# Patient Record
Sex: MALE | Race: WHITE | Employment: FULL TIME | URBAN - METROPOLITAN AREA
[De-identification: names, ages, dates, MRNs, and addresses within clinical notes are randomized per-mention and may not be internally consistent; named-entity substitution may affect disease eponyms.]

---

## 2017-11-06 ENCOUNTER — OFFICE VISIT (OUTPATIENT)
Dept: ORTHOPEDIC SURGERY | Age: 57
End: 2017-11-06

## 2017-11-06 VITALS
SYSTOLIC BLOOD PRESSURE: 107 MMHG | DIASTOLIC BLOOD PRESSURE: 78 MMHG | HEIGHT: 74 IN | WEIGHT: 264.99 LBS | BODY MASS INDEX: 34.01 KG/M2 | HEART RATE: 85 BPM

## 2017-11-06 DIAGNOSIS — M25.561 PAIN AND SWELLING OF KNEE, RIGHT: Primary | ICD-10-CM

## 2017-11-06 DIAGNOSIS — M25.461 PAIN AND SWELLING OF KNEE, RIGHT: Primary | ICD-10-CM

## 2017-11-06 DIAGNOSIS — S83.241A TEAR OF MEDIAL MENISCUS OF RIGHT KNEE, CURRENT, UNSPECIFIED TEAR TYPE, INITIAL ENCOUNTER: ICD-10-CM

## 2017-11-06 PROCEDURE — 99203 OFFICE O/P NEW LOW 30 MIN: CPT | Performed by: ORTHOPAEDIC SURGERY

## 2017-11-06 NOTE — PROGRESS NOTES
CHIEF COMPLAINT:    Chief Complaint   Patient presents with    Knee Pain     right knee pain. ER on 11/3/17 after he was going down steps & felt a pop. HISTORY OF PRESENT ILLNESS:                The patient is a 56 y.o. male   Past Medical History:   Diagnosis Date    Hyperlipidemia       This patient presents with right knee pain. He was on some bleachers and felt acute onset his knee about a week ago. Then, on 11/3/17 he is going some steps and had immediate and severe pain with a pop in the back of his knee. He is to sit down for a couple of minutes before he can even get around. He then went to the emergency department. 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 He has had surgery on the LEFT knee.     The pain assessment was noted & is as follows:  Pain Assessment  Location of Pain: Knee  Location Modifiers: Right  Severity of Pain: 7  Quality of Pain: Aching, Dull, Sharp, Throbbing  Duration of Pain: Persistent  Frequency of Pain: Intermittent  Aggravating Factors: Walking, Bending, Stretching  Limiting Behavior: Some  Relieving Factors: Rest  Result of Injury: Yes  Work-Related Injury: No  Are there other pain locations you wish to document?: No]      Work Status/Functionality:     Past Medical History: Medical history form was reviewed today & can be found in the media tab  Past Medical History:   Diagnosis Date    Hyperlipidemia       Past Surgical History:     Past Surgical History:   Procedure Laterality Date    HAND SURGERY      KNEE SURGERY      left    TONSILLECTOMY       Current Medications:     Current Outpatient Prescriptions:     omeprazole (PRILOSEC) 40 MG delayed release capsule, Take 40 mg by mouth daily, Disp: , Rfl:     atorvastatin (LIPITOR) 40 MG tablet, Take 40 mg by mouth daily, Disp: , Rfl:     naproxen (NAPROSYN) 500 MG tablet, Take 1 tablet by mouth 2 times daily, Disp: 30 tablet, Rfl: 0  Allergies:  Penicillins  Social History:    reports that he has never smoked. He has never used smokeless tobacco.  Family History:   History reviewed. No pertinent family history. REVIEW OF SYSTEMS:   For new problems, a full review of systems will be found scanned in the patient's chart. CONSTITUTIONAL: Denies unexplained weight loss, fevers, chills   NEUROLOGICAL: Denies unsteady gait or progressive weakness  SKIN: Denies skin changes, delayed healing, rash, itching       PHYSICAL EXAM:    Vitals: Blood pressure 107/78, pulse 85, height 6' 2.02\" (1.88 m), weight 264 lb 15.9 oz (120.2 kg). GENERAL EXAM:  · General Apparence: Patient is adequately groomed with no evidence of malnutrition. · Orientation: The patient is oriented to time, place and person. · Mood & Affect:The patient's mood and affect are appropriate       Right knee PHYSICAL EXAMINATION:  · Inspection:  1+ effusion. No obvious deformity or ecchymosis or edema. · Palpation:  Tender posterior medially over the medial meniscus. · Range of Motion: Limited by pain. 0-90°. · Strength: No focal motor weakness    · Special Tests:  Reproducible pain with Francisco testing medially. ACL MCL PCL LCL are all intact. Negative lateral Francisco. · Skin:  There are no rashes, ulcerations or lesions. · Gait & station: Crutches and knee immobilizer      · Additional Examinations:        Left Lower Extremity: Examination of the left lower extremity does not show any tenderness, deformity or injury. Range of motion is unremarkable. There is no gross instability. There are no rashes, ulcerations or lesions. Strength and tone are normal.    Negative straight leg raise examination. Negative log roll examination. Diagnostic Testing:      3 x-ray views of the right knee demonstrates some very early arthritic change consistent with this man's 56years of age.     Orders     Orders Placed This Encounter   Procedures    XR KNEE

## 2017-11-07 ENCOUNTER — TELEPHONE (OUTPATIENT)
Dept: ORTHOPEDIC SURGERY | Age: 57
End: 2017-11-07

## 2017-11-13 ENCOUNTER — OFFICE VISIT (OUTPATIENT)
Dept: ORTHOPEDIC SURGERY | Age: 57
End: 2017-11-13

## 2017-11-13 ENCOUNTER — TELEPHONE (OUTPATIENT)
Dept: ORTHOPEDIC SURGERY | Age: 57
End: 2017-11-13

## 2017-11-13 VITALS
BODY MASS INDEX: 34.01 KG/M2 | DIASTOLIC BLOOD PRESSURE: 75 MMHG | HEART RATE: 68 BPM | SYSTOLIC BLOOD PRESSURE: 130 MMHG | WEIGHT: 264.99 LBS | HEIGHT: 74 IN

## 2017-11-13 DIAGNOSIS — M94.261 CHONDROMALACIA, RIGHT KNEE: ICD-10-CM

## 2017-11-13 DIAGNOSIS — S83.241A TEAR OF MEDIAL MENISCUS OF RIGHT KNEE, CURRENT, UNSPECIFIED TEAR TYPE, INITIAL ENCOUNTER: Primary | ICD-10-CM

## 2017-11-13 DIAGNOSIS — S83.211A BUCKET-HANDLE TEAR OF MEDIAL MENISCUS OF RIGHT KNEE AS CURRENT INJURY, INITIAL ENCOUNTER: Primary | ICD-10-CM

## 2017-11-13 PROCEDURE — 99213 OFFICE O/P EST LOW 20 MIN: CPT | Performed by: ORTHOPAEDIC SURGERY

## 2017-11-13 NOTE — ADDENDUM NOTE
Encounter addended by: Harris Shone, MA on: 11/13/2017 11:35 AM<BR>    Actions taken: Letter status changed

## 2017-11-13 NOTE — ADDENDUM NOTE
Encounter addended by: Rochelle Slater MA on: 11/13/2017  4:17 PM<BR>    Actions taken: Letter status changed

## 2017-11-13 NOTE — PROGRESS NOTES
CHIEF COMPLAINT:    Chief Complaint   Patient presents with    Results     RIGHT KNEE MRI RESULTS        HISTORY OF PRESENT ILLNESS:                The patient is a 56 y.o. male who returns to clinic to review his MRI results of the right knee. He has been using a knee immobilizer and crutches since his last visit which she states has been helping with his pain. He points the medial aspect of the knee as a location of his pain. He does have an upcoming trip in the beginning of December to [place]    Past Medical History:   Diagnosis Date    Hyperlipidemia           The pain assessment was noted & is as follows:  Pain Assessment  Location of Pain: Knee  Location Modifiers: Right  Severity of Pain: 1  Quality of Pain: Aching, Sharp  Duration of Pain: Persistent  Frequency of Pain: Intermittent  Aggravating Factors: Bending, Exercise, Walking, Standing, Stairs  Limiting Behavior: Yes  Relieving Factors: Rest  Result of Injury: Yes]      Work Status/Functionality:     Past Medical History: Medical history form was reviewed today & can be found in the media tab  Past Medical History:   Diagnosis Date    Hyperlipidemia       Past Surgical History:     Past Surgical History:   Procedure Laterality Date    HAND SURGERY      KNEE SURGERY      left    TONSILLECTOMY       Current Medications:     Current Outpatient Prescriptions:     omeprazole (PRILOSEC) 40 MG delayed release capsule, Take 40 mg by mouth daily, Disp: , Rfl:     atorvastatin (LIPITOR) 40 MG tablet, Take 40 mg by mouth daily, Disp: , Rfl:     naproxen (NAPROSYN) 500 MG tablet, Take 1 tablet by mouth 2 times daily, Disp: 30 tablet, Rfl: 0  Allergies:  Penicillins  Social History:    reports that he has never smoked. He has never used smokeless tobacco.  Family History:   No family history on file. REVIEW OF SYSTEMS:   For new problems, a full review of systems will be found scanned in the patient's chart.   CONSTITUTIONAL: Denies unexplained to have him wait 2 weeks after surgery to travel if at all possible. If he doesn't have to travel during this period, We would recommend taking a full strength aspirin during this travel to decrease blood clot wrist.    We discussed the risks, benefits, and complications of arthroscopic knee surgery. The patient realizes that there are concerns with this surgery with respect to infection, deep vein thrombosis, neurological injury, delayed  rehabilitation, the possibility of arthrofibrosis of the knee, and specifically  Hoffa's fat pad fibrosis that can potentially cause difficulties. The patient realizes that there are also anesthetic concerns including cardiopulmonary issues, pulmonary issues, and even possibility of death or dystrophy. The patient voiced understanding to this as well as the normal  rehabilitation  that   is involved with weeks of physical therapy, exercise, and strengthening. The patient also realizes that even though the surgery, from a functional perspective, typically allows the patient to return to good function at about 6 weeks, that it often takes 6 months to completely rehabilitate from this operation. The patient also realizes that if there is an arthritic component to the symptoms, then they may still have some degree of arthritis pain. I have personally performed and/or participated in the history, exam and medical decision making and agree with all pertinent clinical information. I have also reviewed and agree with the past medical, family and social history unless otherwise noted. This dictation was performed with a verbal recognition program (DRAGON) and it was checked for errors. It is possible that there are still dictated errors within this office note. If so, please bring any errors to my attention for an addendum. All efforts were made to ensure that this office note is accurate.           Una Sherman MD

## 2017-11-16 ENCOUNTER — HOSPITAL ENCOUNTER (OUTPATIENT)
Dept: SURGERY | Age: 57
Discharge: HOME | End: 2017-11-16
Attending: ORTHOPAEDIC SURGERY | Admitting: ORTHOPAEDIC SURGERY

## 2017-11-16 VITALS
OXYGEN SATURATION: 94 % | WEIGHT: 263 LBS | SYSTOLIC BLOOD PRESSURE: 114 MMHG | BODY MASS INDEX: 33.75 KG/M2 | TEMPERATURE: 97.5 F | DIASTOLIC BLOOD PRESSURE: 71 MMHG | HEIGHT: 74 IN | RESPIRATION RATE: 18 BRPM | HEART RATE: 67 BPM

## 2017-11-16 RX ORDER — ONDANSETRON 2 MG/ML
4 INJECTION INTRAMUSCULAR; INTRAVENOUS
Status: ACTIVE | OUTPATIENT
Start: 2017-11-16 | End: 2017-11-16

## 2017-11-16 RX ORDER — MEPERIDINE HYDROCHLORIDE 50 MG/ML
12.5 INJECTION INTRAMUSCULAR; INTRAVENOUS; SUBCUTANEOUS EVERY 5 MIN PRN
Status: DISCONTINUED | OUTPATIENT
Start: 2017-11-16 | End: 2017-11-17 | Stop reason: HOSPADM

## 2017-11-16 RX ORDER — MORPHINE SULFATE 2 MG/ML
1 INJECTION, SOLUTION INTRAMUSCULAR; INTRAVENOUS EVERY 5 MIN PRN
Status: DISCONTINUED | OUTPATIENT
Start: 2017-11-16 | End: 2017-11-17 | Stop reason: HOSPADM

## 2017-11-16 RX ORDER — HYDROCODONE BITARTRATE AND ACETAMINOPHEN 5; 325 MG/1; MG/1
1 TABLET ORAL EVERY 6 HOURS PRN
Qty: 30 TABLET | Refills: 0 | Status: SHIPPED | OUTPATIENT
Start: 2017-11-16 | End: 2017-12-11 | Stop reason: ALTCHOICE

## 2017-11-16 RX ORDER — SODIUM CHLORIDE 0.9 % (FLUSH) 0.9 %
10 SYRINGE (ML) INJECTION PRN
Status: DISCONTINUED | OUTPATIENT
Start: 2017-11-16 | End: 2017-11-17 | Stop reason: HOSPADM

## 2017-11-16 RX ORDER — HYDRALAZINE HYDROCHLORIDE 20 MG/ML
5 INJECTION INTRAMUSCULAR; INTRAVENOUS EVERY 10 MIN PRN
Status: DISCONTINUED | OUTPATIENT
Start: 2017-11-16 | End: 2017-11-17 | Stop reason: HOSPADM

## 2017-11-16 RX ORDER — DIPHENHYDRAMINE HYDROCHLORIDE 50 MG/ML
12.5 INJECTION INTRAMUSCULAR; INTRAVENOUS
Status: ACTIVE | OUTPATIENT
Start: 2017-11-16 | End: 2017-11-16

## 2017-11-16 RX ORDER — PROMETHAZINE HYDROCHLORIDE 25 MG/ML
6.25 INJECTION, SOLUTION INTRAMUSCULAR; INTRAVENOUS
Status: ACTIVE | OUTPATIENT
Start: 2017-11-16 | End: 2017-11-16

## 2017-11-16 RX ORDER — ASPIRIN 325 MG
325 TABLET ORAL DAILY
Qty: 14 TABLET | Refills: 0 | Status: SHIPPED | OUTPATIENT
Start: 2017-11-16 | End: 2019-08-13

## 2017-11-16 RX ORDER — LABETALOL HYDROCHLORIDE 5 MG/ML
5 INJECTION, SOLUTION INTRAVENOUS EVERY 10 MIN PRN
Status: DISCONTINUED | OUTPATIENT
Start: 2017-11-16 | End: 2017-11-17 | Stop reason: HOSPADM

## 2017-11-16 RX ORDER — CLINDAMYCIN HYDROCHLORIDE 150 MG/1
CAPSULE ORAL
Qty: 4 CAPSULE | Refills: 0 | Status: SHIPPED | OUTPATIENT
Start: 2017-11-16 | End: 2019-08-13

## 2017-11-16 RX ORDER — SODIUM CHLORIDE 0.9 % (FLUSH) 0.9 %
10 SYRINGE (ML) INJECTION EVERY 12 HOURS SCHEDULED
Status: DISCONTINUED | OUTPATIENT
Start: 2017-11-16 | End: 2017-11-17 | Stop reason: HOSPADM

## 2017-11-16 RX ORDER — OXYCODONE HYDROCHLORIDE AND ACETAMINOPHEN 5; 325 MG/1; MG/1
1 TABLET ORAL PRN
Status: ACTIVE | OUTPATIENT
Start: 2017-11-16 | End: 2017-11-16

## 2017-11-16 RX ORDER — LIDOCAINE HYDROCHLORIDE 10 MG/ML
1 INJECTION, SOLUTION EPIDURAL; INFILTRATION; INTRACAUDAL; PERINEURAL
Status: ACTIVE | OUTPATIENT
Start: 2017-11-16 | End: 2017-11-16

## 2017-11-16 RX ORDER — MORPHINE SULFATE 2 MG/ML
2 INJECTION, SOLUTION INTRAMUSCULAR; INTRAVENOUS EVERY 5 MIN PRN
Status: DISCONTINUED | OUTPATIENT
Start: 2017-11-16 | End: 2017-11-17 | Stop reason: HOSPADM

## 2017-11-16 RX ORDER — SODIUM CHLORIDE, SODIUM LACTATE, POTASSIUM CHLORIDE, CALCIUM CHLORIDE 600; 310; 30; 20 MG/100ML; MG/100ML; MG/100ML; MG/100ML
INJECTION, SOLUTION INTRAVENOUS CONTINUOUS
Status: DISCONTINUED | OUTPATIENT
Start: 2017-11-16 | End: 2017-11-17 | Stop reason: HOSPADM

## 2017-11-16 RX ORDER — OXYCODONE HYDROCHLORIDE AND ACETAMINOPHEN 5; 325 MG/1; MG/1
2 TABLET ORAL PRN
Status: ACTIVE | OUTPATIENT
Start: 2017-11-16 | End: 2017-11-16

## 2017-11-16 RX ADMIN — SODIUM CHLORIDE, SODIUM LACTATE, POTASSIUM CHLORIDE, CALCIUM CHLORIDE: 600; 310; 30; 20 INJECTION, SOLUTION INTRAVENOUS at 14:50

## 2017-11-16 ASSESSMENT — PAIN SCALES - GENERAL
PAINLEVEL_OUTOF10: 0
PAINLEVEL_OUTOF10: 2

## 2017-11-16 ASSESSMENT — PAIN - FUNCTIONAL ASSESSMENT: PAIN_FUNCTIONAL_ASSESSMENT: 0-10

## 2017-11-16 NOTE — ANESTHESIA PRE-OP
Diagnosis Date    Arthritis     GERD (gastroesophageal reflux disease)     Hyperlipidemia        Past Surgical History:        Procedure Laterality Date    HAND SURGERY      KNEE SURGERY      left    LASIK Bilateral     TONSILLECTOMY         Social History:    Social History   Substance Use Topics    Smoking status: Never Smoker    Smokeless tobacco: Never Used    Alcohol use No      Comment: rarely                                Counseling given: Not Answered      Vital Signs (Current):   Vitals:    11/16/17 1438   BP: 125/69   Pulse: 69   Resp: 16   Temp: 97.4 °F (36.3 °C)   SpO2: 97%   Weight: 263 lb (119.3 kg)   Height: 6' 2\" (1.88 m)                                              BP Readings from Last 3 Encounters:   11/16/17 125/69   11/13/17 130/75   11/06/17 107/78       NPO Status: Time of last liquid consumption: 2000                        Time of last solid consumption: 2000                        Date of last liquid consumption: 11/16/17                        Date of last solid food consumption: 11/15/17    BMI:   Wt Readings from Last 3 Encounters:   11/16/17 263 lb (119.3 kg)   11/13/17 264 lb (119.7 kg)   11/13/17 264 lb 15.9 oz (120.2 kg)     Body mass index is 33.77 kg/m².     Anesthesia Evaluation  Patient summary reviewed and Nursing notes reviewed no history of anesthetic complications:   Airway: Mallampati: II  TM distance: >3 FB   Neck ROM: full  Mouth opening: > = 3 FB Dental: normal exam         Pulmonary:Negative Pulmonary ROS and normal exam                               Cardiovascular:Negative CV ROS  Exercise tolerance: good (>4 METS),   (+) hyperlipidemia                  Neuro/Psych:   Negative Neuro/Psych ROS              GI/Hepatic/Renal: Neg GI/Hepatic/Renal ROS  (+) GERD: well controlled,      (-) hiatal hernia       Endo/Other: Negative Endo/Other ROS   (+) : arthritis:., .                 Abdominal:           Vascular:                                     Pre-Operative Diagnosis: MEDIAL MENISCUS TEAR RIGHT KNEE    56 y.o.   BMI:  Body mass index is 33.77 kg/m². Vitals:    11/16/17 1438   BP: 125/69   Pulse: 69   Resp: 16   Temp: 97.4 °F (36.3 °C)   SpO2: 97%   Weight: 263 lb (119.3 kg)   Height: 6' 2\" (1.88 m)       Allergies   Allergen Reactions    Penicillins        Social History   Substance Use Topics    Smoking status: Never Smoker    Smokeless tobacco: Never Used    Alcohol use No      Comment: rarely       LABS:    CBC  No results found for: WBC, HGB, HCT, PLT  RENAL  No results found for: NA, K, CL, CO2, BUN, CREATININE, GLUCOSE  COAGS  No results found for: PROTIME, INR, APTT     Anesthesia Plan      general     ASA 2     (I discussed with the patient the risks and benefits of PIV, general anesthesia, IV Narcotics, PACU. All questions were answered the patient agrees with the plan.)  Induction: intravenous. MIPS: Postoperative opioids intended and Prophylactic antiemetics administered. Anesthetic plan and risks discussed with patient. Plan discussed with CRNA.                   Tricia Mitchell MD   11/16/2017

## 2017-11-16 NOTE — BRIEF OP NOTE
Brief Postoperative Note    Alirio Bush  YOB: 1960  [id number]    Pre-operative Diagnosis: Right knee MMT    Post-operative Diagnosis: Same    Procedure: Right knee arthroscopy, PMM, PLM, chondro, syno    Anesthesia: General    Surgeons/Assistants: Issac Thayer MD, Rudolph MORALES    Estimated Blood Loss: less than 50     Complications: None    Specimens: Was Not Obtained    Findings: MMT, LMT, OA    Electronically signed by ANDREW Ricardo on 11/16/2017 at 4:50 PM

## 2017-11-17 NOTE — ANESTHESIA POST-OP
Postoperative Anesthesia Note    Name:    Mercy Aceves  MRN:      [id number]    Patient Vitals for the past 12 hrs:   BP Temp Temp src Pulse Resp SpO2 Height Weight   11/16/17 1725 114/71 - - 67 18 94 % - -   11/16/17 1705 - - - 68 16 93 % - -   11/16/17 1700 124/73 - - 72 16 94 % - -   11/16/17 1655 105/66 - - 72 16 94 % - -   11/16/17 1651 116/65 97.5 °F (36.4 °C) Temporal 89 16 93 % - -   11/16/17 1438 125/69 97.4 °F (36.3 °C) - 69 16 97 % 6' 2\" (1.88 m) 263 lb (119.3 kg)        LABS:    CBC  No results found for: WBC, HGB, HCT, PLT  RENAL  No results found for: NA, K, CL, CO2, BUN, CREATININE, GLUCOSE  COAGS  No results found for: PROTIME, INR, APTT    Intake & Output: In: 1100 [P.O.:100; I.V.:1000]  Out: 25     Nausea & Vomiting:  No    Level of Consciousness:  Awake    Pain Assessment:  Adequate analgesia    Anesthesia Complications:  No apparent anesthetic complications    SUMMARY      Vital signs stable  OK to discharge from Stage I post anesthesia care.   Care transferred from Anesthesiology department on discharge from perioperative area

## 2017-11-17 NOTE — OP NOTE
that was pathologic was removed. This include  synovectomy at the medial gutter, lateral gutter and across the anterior  aspect of the knee. There was an adhesion from the anterior aspect of the  medial meniscus to the fat pad, which was taken down. No other pathology  was demonstrated. The knee was copiously irrigated and the instruments  were removed and 30 mL of Marcaine was instilled into the portals. Dry  sterile dressings were applied over Steri-Strips. The patient went to the  recovery room postoperatively having tolerated the procedure well.         Stuart Galicia MD    D: 11/17/2017 8:43:35       T: 11/17/2017 11:47:44     [email or web address]  Job#: [id number]     Doc#: [id number]

## 2017-11-18 ENCOUNTER — HOSPITAL ENCOUNTER (OUTPATIENT)
Dept: PHYSICAL THERAPY | Age: 57
Discharge: HOME | End: 2017-11-30
Admitting: ORTHOPAEDIC SURGERY

## 2017-11-18 NOTE — PLAN OF CARE
[facility], [address] [place] [address]  Phone: [phone]  Fax [phone]     Physical Therapy Certification    Dear Referring Practitioner: Dr. Landon Dubin,    We had the pleasure of evaluating the following patient for physical therapy services at [address] [place] [address]. A summary of our findings can be found in the initial assessment below. This includes our plan of care. If you have any questions or concerns regarding these findings, please do not hesitate to contact me at the office phone number checked above. Thank you for the referral.       Physician Signature:_______________________________Date:__________________  By signing above (or electronic signature), therapists plan is approved by physician    Patient: Eddie Zeng   : 1960   MRN: [id number]  Referring Physician: Referring Practitioner: Dr. Landon Dubin      Evaluation Date: 2017      Medical Diagnosis Information:  Diagnosis: T53.865B [place] meniscus tear R knee    Treatment Diagnosis: Y92.763T R knee meniscus buckethandle tear, M25.561 R knee pain, MM25.661 R knee stiffness                                         Insurance information: PT Insurance Information: [facility] - 30 visits     Precautions/ Contra-indications: None   Latex Allergy:  [x]NO      []YES  Preferred Language for Healthcare:   [x]English       []other:    SUBJECTIVE: Patient stated complaint: Pt initially hurt the R knee walking down the stairs 2 weeks ago. His knee gave way and he felt a pop and pain. Pt had an MRI and had surgery on 17. Pt is flying to [address] [place] [address] on 17 to take granddaughters to [facility] and would like to be able to walk around.      Relevant Medical History: Knee surgery   Functional Disability Index:PT G-Codes  Functional Assessment Tool Used: LEFS  Score: 84%   Functional Limitation: Mobility: Walking and moving around  Mobility: Walking and Moving Around Current Status (): At least 80 percent but less than 100 percent impaired, limited or restricted  Mobility: Walking and Moving Around Goal Status (): At least 20 percent but less than 40 percent impaired, limited or restricted    Pain Scale: 7/10  Easing factors: ice, meds  Provocative factors: bending knee when walking     Type: [x]Constant   []Intermittent  []Radiating []Localized []other:     Numbness/Tingling: none     Occupation/School: Desk job - store owner     Living Status/Prior Level of Function: Independent with ADLs and IADLs,     OBJECTIVE:     ROM LEFT RIGHT   Knee ext -6 from zero -12 from zero   Knee Flex 130 70   Strength  LEFT RIGHT   Quad tone  good Fair    Circumference  Mid apex  7 cm prox     44 cm    45.5 cm     Reflexes/Sensation:    [x]Dermatomes/Myotomes intact    []Reflexes equal and normal bilaterally   []Other:    Joint mobility:    []Normal    [x]Hypo   []Hyper    Palpation: slight increase in warmth around the knee. Decreased patella mobility. Tender over lateral HS    Functional Mobility/Transfers: Moderate assist to move R leg up onto plinth    Posture: Pt hesitant to bend the R knee. Bend forward with crutches    Bandages/Dressings/Incisions: Post op bandages were removed. Steri strips were covered with tegaderm. Some dried blood was present. No signs of infection. Gait: (include devices/WB status) Pt amb WBAT with B crutches. Crutches had to be adjusted and pt was educated on proper ambulation pattern. Heel toe and knee bend. Orthopedic Special Tests: NA                        [x] Patient history, allergies, meds reviewed. Medical chart reviewed. See intake form. Review Of Systems (ROS):  [x]Performed Review of systems (Integumentary, CardioPulmonary, Neurological) by intake and observation. Intake form has been scanned into medical record.  Patient has been instructed to contact their primary care physician regarding ROS issues if not []Signs/symptoms consistent with tendinitis/tendinosis    []signs/symptoms consistent with pathology which may benefit from Dry needling      []other:      Prognosis/Rehab Potential:      []Excellent   [x]Good    []Fair   []Poor    Tolerance of evaluation/treatment: Pt got dizzy two separate times during evaluation    []Excellent   []Good    [x]Fair   []Poor  Physical Therapy Evaluation Complexity Justification  [x] A history of present problem with:  [x] no personal factors and/or comorbidities that impact the plan of care;  []1-2 personal factors and/or comorbidities that impact the plan of care  []3 personal factors and/or comorbidities that impact the plan of care  [x] An examination of body systems using standardized tests and measures addressing any of the following: body structures and functions (impairments), activity limitations, and/or participation restrictions;:  [x] a total of 1-2 or more elements   [] a total of 3 or more elements   [] a total of 4 or more elements   [x] A clinical presentation with:  [] stable and/or uncomplicated characteristics   [x] evolving clinical presentation with changing characteristics  [] unstable and unpredictable characteristics;   [x] Clinical decision making of [x] low, [] moderate, [] high complexity using standardized patient assessment instrument and/or measurable assessment of functional outcome. [x] EVAL (LOW) 12104 (typically 20 minutes face-to-face)  [] EVAL (MOD) 22921 (typically 30 minutes face-to-face)  [] EVAL (HIGH) 94319 (typically 45 minutes face-to-face)  [] RE-EVAL       PLAN:   Frequency/Duration:  2 days per week for 4 Weeks:  Interventions:  [x]  Therapeutic exercise including: strength training, ROM, for Lower extremity and core   [x]  NMR activation and proprioception for LE, Glutes and Core   [x]  Manual therapy as indicated for LE, Hip and spine to include: Dry Needling/IASTM, STM, PROM, Gr I-IV mobilizations, manipulation.    [x] Modalities as needed that may include: thermal agents, E-stim, Biofeedback, US, iontophoresis as indicated  [x] Patient education on joint protection, postural re-education, activity modification, progression of HEP. HEP instruction: (see scanned forms)    GOALS:  Patient stated goal: Be ready to take slick to [place] and build strength in the knee     Therapist goals for Patient:   Short Term Goals: To be achieved in: 2 weeks  1. Independent in HEP and progression per patient tolerance, in order to prevent re-injury. 2. Patient will have a decrease in pain to facilitate improvement in movement, function, and ADLs as indicated by Functional Deficits. Long Term Goals: To be achieved in: 4 weeks  1. Disability index score of 35% or less for the LEFS to assist with reaching prior level of function. 2. Patient will demonstrate increased AROM to knee flex 120 and ext:-5 from zero to allow for proper joint functioning as indicated by patients Functional Deficits. 3. Patient will demonstrate an increase in Strength to good proximal hip strength and control, within 5lb HHD in LE to allow for proper functional mobility as indicated by patients Functional Deficits. 4. Patient will return to walking and golf without increased symptoms or restriction. 5. Pt will demonstrate a proper gait pattern without assistive device and will be able to demonstrate a TUG in under 12 sec.       Electronically signed by:  Tariq Zambrano PT, DPT #[id number]

## 2017-11-18 NOTE — FLOWSHEET NOTE
[facility],  [address]  Phone: [phone]  Fax [phone]    Physical Therapy Daily Treatment Note  Date:  2017    Patient Name:  Eliud Gibson  \"Maribel\"  :  1960  MRN: [id number]  Restrictions/Precautions:    Medical/Treatment Diagnosis Information:  · Diagnosis: S83.211A Buckethandle meniscus tear R knee   · Treatment Diagnosis: S83.211A R knee meniscus buckethandle tear, M25.561 R knee pain, MM25.661 R knee stiffness  Insurance/Certification information:  [facility]  Physician Information:  Referring Practitioner: Dr. Michael Abdul of care signed (Y/N):     Date of Patient follow up with Physician: 17    G-Code (if applicable):      Date G-Code Applied:    PT G-Codes  Functional Assessment Tool Used: LEFS  Score: 84%   Functional Limitation: Mobility: Walking and moving around  Mobility: Walking and Moving Around Current Status (): At least 80 percent but less than 100 percent impaired, limited or restricted  Mobility: Walking and Moving Around Goal Status ():  At least 20 percent but less than 40 percent impaired, limited or restricted    Progress Note: [x]  Yes  []  No  Next due by: Visit #10       Latex Allergy:  [x]NO      []YES  Preferred Language for Healthcare:   [x]English       []other:    Visit # Insurance Allowable Requires auth       [x]no        []yes:       Pain level:  7/10     SUBJECTIVE:  See eval    OBJECTIVE: See eval  Observation:   Test measurements:      RESTRICTIONS/PRECAUTIONS: Pt did get dizzy 2x during initial evaluation    Exercises/Interventions:     Therapeutic Ex Sets/sec Reps Notes   gastroc belt stretch  30 3    HS stretch  30 3    Quad set  5 15    Heel slides  10 10    SLR   5  PT assist                                       Manual Intervention                                          NMR re-education Therapeutic Exercise and NMR EXR  [] (38104) Provided verbal/tactile cueing for activities related to strengthening, flexibility, endurance, ROM for improvements in LE, proximal hip, and core control with self care, mobility, lifting, ambulation.  [] (45537) Provided verbal/tactile cueing for activities related to improving balance, coordination, kinesthetic sense, posture, motor skill, proprioception  to assist with LE, proximal hip, and core control in self care, mobility, lifting, ambulation and eccentric single leg control.      NMR and Therapeutic Activities:    [] (19903 or 05453) Provided verbal/tactile cueing for activities related to improving balance, coordination, kinesthetic sense, posture, motor skill, proprioception and motor activation to allow for proper function of core, proximal hip and LE with self care and ADLs  [] (34557) Gait Re-education- Provided training and instruction to the patient for proper LE, core and proximal hip recruitment and positioning and eccentric body weight control with ambulation re-education including up and down stairs     Home Exercise Program:    [x] (43492) Reviewed/Progressed HEP activities related to strengthening, flexibility, endurance, ROM of core, proximal hip and LE for functional self-care, mobility, lifting and ambulation/stair navigation   [x] (97887)Reviewed/Progressed HEP activities related to improving balance, coordination, kinesthetic sense, posture, motor skill, proprioception of core, proximal hip and LE for self care, mobility, lifting, and ambulation/stair navigation      Manual Treatments:  PROM / STM / Oscillations-Mobs:  G-I, II, III, IV (PA's, Inf., Post.)  [] (25306) Provided manual therapy to mobilize LE, proximal hip and/or LS spine soft tissue/joints for the purpose of modulating pain, promoting relaxation,  increasing ROM, reducing/eliminating soft tissue swelling/inflammation/restriction, improving soft tissue

## 2017-11-20 ENCOUNTER — OFFICE VISIT (OUTPATIENT)
Dept: ORTHOPEDIC SURGERY | Age: 57
End: 2017-11-20

## 2017-11-20 ENCOUNTER — HOSPITAL ENCOUNTER (OUTPATIENT)
Dept: PHYSICAL THERAPY | Age: 57
Discharge: HOME | End: 2017-11-20
Admitting: ORTHOPAEDIC SURGERY

## 2017-11-20 VITALS
SYSTOLIC BLOOD PRESSURE: 117 MMHG | HEIGHT: 74 IN | DIASTOLIC BLOOD PRESSURE: 79 MMHG | HEART RATE: 80 BPM | BODY MASS INDEX: 33.75 KG/M2 | WEIGHT: 263.01 LBS

## 2017-11-20 DIAGNOSIS — Z98.890 H/O ARTHROSCOPY OF KNEE: ICD-10-CM

## 2017-11-20 DIAGNOSIS — S83.241A TEAR OF MEDIAL MENISCUS OF RIGHT KNEE, CURRENT, UNSPECIFIED TEAR TYPE, INITIAL ENCOUNTER: Primary | ICD-10-CM

## 2017-11-20 DIAGNOSIS — M94.261 CHONDROMALACIA, RIGHT KNEE: ICD-10-CM

## 2017-11-20 PROCEDURE — 99024 POSTOP FOLLOW-UP VISIT: CPT | Performed by: ORTHOPAEDIC SURGERY

## 2017-11-20 NOTE — FLOWSHEET NOTE
[facility],  [address] [place]  Phone: [phone]  Fax [phone]    Physical Therapy Daily Treatment Note  Date:  2017    Patient Name:  Justin Lyons  \"Maribel\"  :  1960  MRN: [id number]  Restrictions/Precautions:    Medical/Treatment Diagnosis Information:  · Diagnosis: S83.211A Buckethandle meniscus tear R knee   · Treatment Diagnosis: S83.211A R knee meniscus buckethandle tear, M25.561 R knee pain, MM25.661 R knee stiffness  Insurance/Certification information:  PT Insurance Information: [facility] - 30 visits  Physician Information:  Referring Practitioner: Dr. Tiara Valencia of care signed (Y/N):     Date of Patient follow up with Physician: 17    G-Code (if applicable):      Date G-Code Applied:         Progress Note: [x]  Yes  []  No  Next due by: Visit #10       Latex Allergy:  [x]NO      []YES  Preferred Language for Healthcare:   [x]English       []other:    Visit # Insurance Allowable Requires auth   2 30    [x]no        []yes:       Pain level:  3-4/10 right knee     SUBJECTIVE:  Pt states that he has been compliant with HEP twice a day. OBJECTIVE:   Observation: Amb w/ two crutches, WB'ing as ling'd right LE. Tegaderm and steri strips cover portal sites. No drainage or sign of infection. No calf tenderness/ redness/ or pain. Mabel's negative. Progressed TE and HEP w/ good tolerance. Pt given new written handout. Mod edema. Initiated V-comp/elevation/ankle pumps.   Test measurements:  -6 to 95°    RESTRICTIONS/PRECAUTIONS: Pt did get dizzy 2x during initial evaluation    Exercises/Interventions:     Therapeutic Ex Sets/sec Reps Notes   [place] 5 min     Gastroc Stretch on Incline H30x3           SB Bridge  H5 2x10     SB Knee Flex Stretch w/ Strap H10x10    Prone quad set w/ shin into [id number] 2017   Prone Quad Stretch w/ [id number] 2017   Prione sustained extension 2x 1 min     SAQ 0#H5 2x10  Hep [id number]   Standing TKE blue tb H5 2x10  HEP -2017   HS stretch  30 3    Quad set  5 15    Heel slides  10 10    SLR  3 5  PT assist                                       Manual Intervention      Pat JM, HF/quad/IT Band Stretching, kne flex-ext ROM 15 min                                   NMR re-education                                              Therapeutic Exercise and NMR EXR  [x] (37919) Provided verbal/tactile cueing for activities related to strengthening, flexibility, endurance, ROM for improvements in LE, proximal hip, and core control with self care, mobility, lifting, ambulation.  [] (36516) Provided verbal/tactile cueing for activities related to improving balance, coordination, kinesthetic sense, posture, motor skill, proprioception  to assist with LE, proximal hip, and core control in self care, mobility, lifting, ambulation and eccentric single leg control.      NMR and Therapeutic Activities:    [] (86678 or 02039) Provided verbal/tactile cueing for activities related to improving balance, coordination, kinesthetic sense, posture, motor skill, proprioception and motor activation to allow for proper function of core, proximal hip and LE with self care and ADLs  [] (08372) Gait Re-education- Provided training and instruction to the patient for proper LE, core and proximal hip recruitment and positioning and eccentric body weight control with ambulation re-education including up and down stairs     Home Exercise Program:    [x] (33684) Reviewed/Progressed HEP activities related to strengthening, flexibility, endurance, ROM of core, proximal hip and LE for functional self-care, mobility, lifting and ambulation/stair navigation   [x] (00120)Reviewed/Progressed HEP activities related to improving balance, coordination, kinesthetic sense, posture, motor skill, proprioception of core, proximal hip and LE for self care, mobility, lifting, and ambulation/stair navigation

## 2017-11-29 ENCOUNTER — HOSPITAL ENCOUNTER (OUTPATIENT)
Dept: PHYSICAL THERAPY | Age: 57
Discharge: HOME | End: 2017-11-29
Admitting: ORTHOPAEDIC SURGERY

## 2017-11-29 NOTE — FLOWSHEET NOTE
[facility], [address] [place]  Phone: [phone]  Fax [phone]    Physical Therapy Daily Treatment Note  Date:  2017    Patient Name:  Ayah Modi  \"Dubs\"  :  1960  MRN: [id number]  Restrictions/Precautions:    Medical/Treatment Diagnosis Information:  · Diagnosis: S83.211A Buckethandle meniscus tear R knee  Sx 17  · Treatment Diagnosis: S83.211A R knee meniscus buckethandle tear, M25.561 R knee pain, MM25.661 R knee stiffness  Insurance/Certification information:  PT Insurance Information: [facility] - 30 visits  Physician Information:  Referring Practitioner: Dr. Brianna Montana of care signed (Y/N):     Date of Patient follow up with Physician:     G-Code (if applicable):      Date G-Code Applied:         Progress Note: []  Yes  [x]  No  Next due by: Visit #10       Latex Allergy:  [x]NO      []YES  Preferred Language for Healthcare:   [x]English       []other:    Visit # Insurance Allowable Requires auth   3 30    [x]no        []yes:       Pain level:  0/10 right knee     SUBJECTIVE:  Pt reports his knee is stiff when he first gets up but then loosens up. Compliant with HEP and has added some marches.   Pt reports he is no longer using his crutches     OBJECTIVE: almost 2 weeks post op  Observation: pt ambulating without crutches with proper gait     Test measurements: R knee ext  AROM  -2°      RESTRICTIONS/PRECAUTIONS: Pt did get dizzy 2x during initial evaluation    Exercises/Interventions:     Therapeutic Ex Sets/sec Reps Notes   [place]ike [place] 5 min Full rev     Gastroc Stretch on Incline H30x3     SL hip abd  3\" 2x10      SB Bridge  H5 2x10     SB Knee Flex Stretch w/ Strap H10x10    Prone quad set w/ shin into [id number] 2017   Prone Quad Stretch w/ [id number] 2017   Prione sustained extension  2 min 4#    SAQ 2#H5 2x10  Hep [id number]   Standing TKE blue tb H5 2x10  HEP  ambulation/stair navigation      Manual Treatments:  PROM / STM / Oscillations-Mobs:  G-I, II, III, IV (PA's, Inf., Post.)  [] (33536) Provided manual therapy to mobilize LE, proximal hip and/or LS spine soft tissue/joints for the purpose of modulating pain, promoting relaxation,  increasing ROM, reducing/eliminating soft tissue swelling/inflammation/restriction, improving soft tissue extensibility and allowing for proper ROM for normal function with self care, mobility, lifting and ambulation. Modalities:  HV ES/V-Comp/Elevation/Ankle Pumps right knee- 15 min     Charges:  Timed Code Treatment Minutes: 45   Total Treatment Minutes: 60     [] EVAL (LOW) 76188 (typically 20 minutes face-to-face)  [] EVAL (MOD) 83339 (typically 30 minutes face-to-face)  [] EVAL (HIGH) 32120 (typically 45 minutes face-to-face)  [] RE-EVAL     [x] LZ(05072) x  2   [] IONTO  [] NMR (46438) x      [x] VASO  [x] Manual (70324) x  1    [] Other:  [] TA x       [] Mech Traction (20685)  [] ES(attended) (35572)      [x] ES (un) (05929):     GOALS: Patient stated goal: Be ready to take slick to [place] and build strength in the knee      Therapist goals for Patient:   Short Term Goals: To be achieved in: 2 weeks  1. Independent in HEP and progression per patient tolerance, in order to prevent re-injury. met  2. Patient will have a decrease in pain to facilitate improvement in movement, function, and ADLs as indicated by Functional Deficits. met     Long Term Goals: To be achieved in: 4 weeks  1. Disability index score of 35% or less for the LEFS to assist with reaching prior level of function. 2. Patient will demonstrate increased AROM to knee flex 120 and ext:-5 from zero to allow for proper joint functioning as indicated by patients Functional Deficits.    3. Patient will demonstrate an increase in Strength to good proximal hip strength and control, within 5lb HHD in LE to allow for proper functional mobility as indicated by patients Functional Deficits. 4. Patient will return to walking and golf without increased symptoms or restriction. 5. Pt will demonstrate a proper gait pattern without assistive device and will be able to demonstrate a TUG in under 12 sec. Progression Towards Functional goals:  [x] Patient is progressing as expected towards functional goals listed. [] Progression is slowed due to complexities listed. [] Progression has been slowed due to co-morbidities.   [] Plan just implemented, too soon to assess goals progression  [] Other:     ASSESSMENT:  Increasing knee ROM, decreasing pain and improving gait without an AD     Treatment/Activity Tolerance:  [x] Patient tolerated treatment well [] Patient limited by fatique  [x] Patient limited by pain  [] Patient limited by other medical complications  [] Other:     Prognosis: [x] Good [] Fair  [] Poor    Patient Requires Follow-up: [x] Yes  [] No    PLAN: Cont 2xwk  [x] Continue per plan of care [] Alter current plan (see comments)  [] Plan of care initiated [] Hold pending MD visit [] Discharge    Electronically signed by: Larisa Echols [place] [id number]

## 2017-11-29 NOTE — FLOWSHEET NOTE
[facility],  [address] [place]  Phone: [phone]  Fax [phone]      ATHLETIC TRAINING [address]  Date:  2017    Patient Name:  Narda Boyer  \"Dub\" :  1960  MRN: [id number]  Restrictions/Precautions:    Medical/Treatment Diagnosis Information:  ·  R knee bucket handle meniscus tear s/p R knee PMM  ·  R knee pain, stiffness  Physician Information:   Dr. Reena Beltran Post-op  8 wks  12 wks 16 wks 20 wks   24 wks                            Activity Log                                                  DOS/DOI:                                                    Date: 17    ATC communication Pt reports he is prone to HS cramping, so not to overdo [address]. Did fine today   Bike    Elliptical    Treadmill    Airdyne        Gastroc stretch    Soleus stretch    Hamstring stretch    ITB stretch    Hip Flexor stretch    Quad stretch    Adductor stretch        Weight Shifting sp                              fp                              tp    Lateral walking (with/w/o TB)        Balance: PEP/Jes board                   SLS w/PT         Star excursion load/explode          Extremity reach UE/LE        Leg Press Kevin. 90# 3x10                     Ecc.                      Inv. Calf Press Kevin. Ecc.                        Inv.        KARINA   Flex               ABd 45# R/L 2x10              ADd              TKE 60# 20x5\"              Ext        Steps Up w/PT              Up and Over               Down               Lateral               Rotation        Squats  mini                  wall                 BOSU         Lunges:  Lunge to Balance                   Balance to Lunge                   Walking        Knee Extension Bilat. Ecc.                               Inv. Hamstring Curls Bilat.  40# 2x10 Ecc.                               Inv.        Soleus Press Bilat. Ecc.                           Inv.                             Ladders                Square               Jump/Hop  Low                      Med.                      High                                                            Modality ES/VPulse 15'   Initials                             [address]

## 2017-12-01 ENCOUNTER — HOSPITAL ENCOUNTER (OUTPATIENT)
Dept: PHYSICAL THERAPY | Age: 57
Discharge: HOME | End: 2017-12-31
Attending: ORTHOPAEDIC SURGERY | Admitting: ORTHOPAEDIC SURGERY

## 2017-12-01 ENCOUNTER — HOSPITAL ENCOUNTER (OUTPATIENT)
Dept: PHYSICAL THERAPY | Age: 57
Discharge: HOME | End: 2017-12-01
Admitting: ORTHOPAEDIC SURGERY

## 2017-12-01 NOTE — FLOWSHEET NOTE
[facility], [address] [place]  Phone: [phone]  Fax [phone]    Physical Therapy Daily Treatment Note  Date:  2017    Patient Name:  Cheyenne Durham  \"Dubs\"  :  1960  MRN: [id number]  Restrictions/Precautions:    Medical/Treatment Diagnosis Information:  · Diagnosis: S83.211A Buckethandle meniscus tear R knee  Sx 17  · Treatment Diagnosis: S83.211A R knee meniscus buckethandle tear, M25.561 R knee pain, MM25.661 R knee stiffness  Insurance/Certification information:  PT Insurance Information: [facility] - 30 visits  Physician Information:  Referring Practitioner: Dr. Yovany Saez of care signed (Y/N):     Date of Patient follow up with Physician:     G-Code (if applicable):      Date G-Code Applied:         Progress Note: []  Yes  [x]  No  Next due by: Visit #10       Latex Allergy:  [x]NO      []YES  Preferred Language for Healthcare:   [x]English       []other:    Visit # Insurance Allowable Requires auth   4 30    [x]no        []yes:       Pain level:  1/10 right knee     SUBJECTIVE: Pt states that he is now able to walk around his house and in community without right knee pain, but he still has pain behind his knee cap when he goes up and down the stairs. OBJECTIVE:   Observation: Pt encouraged to keep stair ambulation to a minimum. Still tight into flexion. Initiated EZ stretch. Test measurements: R knee ext  AROM by end of rx -2-106°. Irving set 3+ to 4 -. TUG score =9 sec.     RESTRICTIONS/PRECAUTIONS: Pt did get dizzy 2x during initial evaluation    Exercises/Interventions:     Therapeutic Ex Sets/sec Reps Notes   Bike  5 min      Gastroc Stretch on Incline H30x3     SL hip abd w/ ext into wall 3\" 2x10      Bridge w/ MN H5 2x10     SB Knee Flex Stretch w/ Strap H10x10    Prone quad set w/ shin into [id number] 2017   Prone Quad Stretch w/ [id number] 2017   Prone sustained extension  2 min 4#    SAQ 3#H5 2x10  Hep [id number]   HS stretch w/ chair 30 3 B          SLR  H5 2x10   Side stepping at 1/2wall with TB  At ankles Light GTB 3 laps      Mini Wall Slide w/ ball Squeeze H20x6                             Manual Intervention      Pat JM, HF/quad/IT Band Stretching, kne flex-ext ROM 15 min                                   NMR re-education      Tandem on airex  10\"x5      LSU and FSU  4\" 2x10      Reverse BOSU Mini Squat w/ ball lift 4# H5 x20                           Therapeutic Exercise and NMR EXR  [x] (73454) Provided verbal/tactile cueing for activities related to strengthening, flexibility, endurance, ROM for improvements in LE, proximal hip, and core control with self care, mobility, lifting, ambulation.  [] (56095) Provided verbal/tactile cueing for activities related to improving balance, coordination, kinesthetic sense, posture, motor skill, proprioception  to assist with LE, proximal hip, and core control in self care, mobility, lifting, ambulation and eccentric single leg control.      NMR and Therapeutic Activities:    [] (65772 or 87278) Provided verbal/tactile cueing for activities related to improving balance, coordination, kinesthetic sense, posture, motor skill, proprioception and motor activation to allow for proper function of core, proximal hip and LE with self care and ADLs  [] (65934) Gait Re-education- Provided training and instruction to the patient for proper LE, core and proximal hip recruitment and positioning and eccentric body weight control with ambulation re-education including up and down stairs     Home Exercise Program:    [x] (99524) Reviewed/Progressed HEP activities related to strengthening, flexibility, endurance, ROM of core, proximal hip and LE for functional self-care, mobility, lifting and ambulation/stair navigation   [x] (47408)Reviewed/Progressed HEP activities related to improving balance, coordination, kinesthetic sense, posture, motor good proximal hip strength and control, within 5lb HHD in LE to allow for proper functional mobility as indicated by patients Functional Deficits. 4. Patient will return to walking and golf without increased symptoms or restriction. 5. Pt will demonstrate a proper gait pattern without assistive device and will be able to demonstrate a TUG in under 12 sec. Met 12-1-2017    Progression Towards Functional goals:  [x] Patient is progressing as expected towards functional goals listed. [] Progression is slowed due to complexities listed. [] Progression has been slowed due to co-morbidities. [] Plan just implemented, too soon to assess goals progression  [] Other:     ASSESSMENT:  Increase ROM. Increase tolerance to closed chain TE.     Treatment/Activity Tolerance:  [x] Patient tolerated treatment well [] Patient limited by fatique  [] Patient limited by pain  [] Patient limited by other medical complications  [] Other:     Prognosis: [x] Good [] Fair  [] Poor    Patient Requires Follow-up: [x] Yes  [] No    PLAN: Cont 2xwk  [x] Continue per plan of care [] Alter current plan (see comments)  [] Plan of care initiated [] Hold pending MD visit [] Discharge    Electronically signed by: Nery Lambert, [id number]

## 2017-12-05 ENCOUNTER — HOSPITAL ENCOUNTER (OUTPATIENT)
Dept: PHYSICAL THERAPY | Age: 57
Discharge: HOME | End: 2017-12-05
Admitting: ORTHOPAEDIC SURGERY

## 2017-12-05 NOTE — FLOWSHEET NOTE
[facility], [address] [place]  Phone: [phone]  Fax [phone]    Physical Therapy Daily Treatment Note  Date:  2017    Patient Name:  Rosio Jacques  \"Dubs\"  :  1960  MRN: [id number]  Restrictions/Precautions:    Medical/Treatment Diagnosis Information:  · Diagnosis: S83.211A Buckethandle meniscus tear R knee  Sx 17  · Treatment Diagnosis: S83.211A R knee meniscus buckethandle tear, M25.561 R knee pain, MM25.661 R knee stiffness  Insurance/Certification information:  PT Insurance Information: [facility] - 30 visits  Physician Information:  Referring Practitioner: Dr. Olu Pichardo of care signed (Y/N):     Date of Patient follow up with Physician:     G-Code (if applicable):      Date G-Code Applied:         Progress Note: []  Yes  [x]  No  Next due by: Visit #10       Latex Allergy:  [x]NO      []YES  Preferred Language for Healthcare:   [x]English       []other:    Visit # Insurance Allowable Requires auth    30    [x]no        []yes:       Pain level:  1/10 right knee     SUBJECTIVE: pt arrived 11 min late for appt. Pt reports his knee is feeling ok. He still gets some pinching occ on the inside of his knee. He feels at random times when he is walking or bending his knee. Pt reports is doing fine with going up stairs but still some trouble with going down stairs.   Pt reports he was in [place] over the weekend and did a lot of walking and knee felt fine     OBJECTIVE:   Observation:   Test measurements: R knee flex °    RESTRICTIONS/PRECAUTIONS: Pt did get dizzy 2x during initial evaluation    Exercises/Interventions:     Therapeutic Ex Sets/sec Reps Notes   Bike  5 min      Gastroc Stretch on Incline H30x3     SL hip abd w/ ext into wall 3\" 2x10      Bridge w/ OK H5 2x10     SB Knee Flex Stretch w/ Strap H10x10    Prone quad set w/ shin into [id number] 2017   Prone Quad Stretch w/ (56562)Reviewed/Progressed HEP activities related to improving balance, coordination, kinesthetic sense, posture, motor skill, proprioception of core, proximal hip and LE for self care, mobility, lifting, and ambulation/stair navigation      Manual Treatments:  PROM / STM / Oscillations-Mobs:  G-I, II, III, IV (PA's, Inf., Post.)  [] (22992) Provided manual therapy to mobilize LE, proximal hip and/or LS spine soft tissue/joints for the purpose of modulating pain, promoting relaxation,  increasing ROM, reducing/eliminating soft tissue swelling/inflammation/restriction, improving soft tissue extensibility and allowing for proper ROM for normal function with self care, mobility, lifting and ambulation. Modalities:  HV ES/V-Comp/Elevation/Ankle Pumps right knee- 15 min     Charges:  Timed Code Treatment Minutes: 45   Total Treatment Minutes: 60     [] EVAL (LOW) 03349 (typically 20 minutes face-to-face)  [] EVAL (MOD) 37607 (typically 30 minutes face-to-face)  [] EVAL (HIGH) 30765 (typically 45 minutes face-to-face)  [] RE-EVAL     [x] MN(34309) x  2   [] IONTO  [x] NMR (32043) x  1   [x] VASO  [] Manual (71586) x       [] Other:  [] TA x       [] Mech Traction (29937)  [] ES(attended) (64854)      [x] ES (un) (08326):     GOALS: Patient stated goal: [facility] [place] and [facility] strength in the knee      Therapist goals for Patient:   Short Term Goals: To be achieved in: 2 weeks  1. Independent in HEP and progression per patient tolerance, in order to prevent re-injury. met  2. Patient will have a decrease in pain to facilitate improvement in movement, function, and ADLs as indicated by Functional Deficits. met     Long Term Goals: To be achieved in: 4 weeks  1. Disability index score of 35% or less for the LEFS to assist with reaching prior level of function.    2. Patient will demonstrate increased AROM to  and ext:-5 from zero to allow for proper joint functioning as indicated by patients

## 2017-12-08 ENCOUNTER — HOSPITAL ENCOUNTER (OUTPATIENT)
Dept: PHYSICAL THERAPY | Age: 57
Discharge: HOME | End: 2017-12-08
Admitting: ORTHOPAEDIC SURGERY

## 2017-12-08 NOTE — FLOWSHEET NOTE
LSD 10x                Rotation            Squats  mini                    wall                   BOSU            Lunges:  Lunge to Balance                     Balance to Lunge                     Walking            Knee Extension Bilat. Ecc.                                 Inv. Hamstring Curls Bilat. 35# (south) 2x10 40# 3x10 40# 3x10                               Ecc.                                 Inv.            Soleus Press Bilat. Ecc.                             Inv.                                   Ladders                  Square                 Jump/Hop  Low                        Med.                        High                                                                  Modality ES/VPulse 15' ES/VPulse 15' ES/Vpulse 15'   Initials                             KELSEY COLE SA

## 2017-12-08 NOTE — FLOWSHEET NOTE
w/ chair H15 x3     FSU 4\" 3x10           SL hip abd w/ ext into wall 1.5 # 3\" 2x10      Bridge w/ WI H5 2x10     Prone Quad Stretch w/ [id number] 11-   SAQ 5#H5 3x10  Hep [id number]   Supine ITB stretch 4x20\"   SLR  1# H5 2x10   SL clams  GTB 3\" 2x10                        Manual Intervention      Pat JM, rolling HF/quad/IT Band Stretching, knee flex-ext ROM 15 min                                   NMR re-education      BOSU Post step back   2x10      Reverse BOSU Mini Squat w/ ball lift 4# H5 x20     SLS BOSU  5\"2x10                     Therapeutic Exercise and NMR EXR  [x] (11917) Provided verbal/tactile cueing for activities related to strengthening, flexibility, endurance, ROM for improvements in LE, proximal hip, and core control with self care, mobility, lifting, ambulation.  [] (87080) Provided verbal/tactile cueing for activities related to improving balance, coordination, kinesthetic sense, posture, motor skill, proprioception  to assist with LE, proximal hip, and core control in self care, mobility, lifting, ambulation and eccentric single leg control.      NMR and Therapeutic Activities:    [] (48629 or 23581) Provided verbal/tactile cueing for activities related to improving balance, coordination, kinesthetic sense, posture, motor skill, proprioception and motor activation to allow for proper function of core, proximal hip and LE with self care and ADLs  [] (02410) Gait Re-education- Provided training and instruction to the patient for proper LE, core and proximal hip recruitment and positioning and eccentric body weight control with ambulation re-education including up and down stairs     Home Exercise Program:    [x] (32390) Reviewed/Progressed HEP activities related to strengthening, flexibility, endurance, ROM of core, proximal hip and LE for functional self-care, mobility, lifting and ambulation/stair navigation   [x] (32920)Reviewed/Progressed HEP activities related to improving balance,

## 2017-12-11 ENCOUNTER — HOSPITAL ENCOUNTER (OUTPATIENT)
Dept: PHYSICAL THERAPY | Age: 57
Discharge: HOME | End: 2017-12-11
Admitting: ORTHOPAEDIC SURGERY

## 2017-12-11 ENCOUNTER — OFFICE VISIT (OUTPATIENT)
Dept: ORTHOPEDIC SURGERY | Age: 57
End: 2017-12-11

## 2017-12-11 VITALS — WEIGHT: 263.01 LBS | HEIGHT: 74 IN | BODY MASS INDEX: 33.75 KG/M2

## 2017-12-11 DIAGNOSIS — Z98.890 H/O ARTHROSCOPY OF KNEE: Primary | ICD-10-CM

## 2017-12-11 PROCEDURE — 99024 POSTOP FOLLOW-UP VISIT: CPT | Performed by: PHYSICIAN ASSISTANT

## 2017-12-11 NOTE — FLOWSHEET NOTE
[facility], [address] [place]  Phone: [phone]  Fax [phone]    Physical Therapy Daily Treatment Note  Date:  2017    Patient Name:  Mariaa Croft  \"Dubs\"  :  1960  MRN: [id number]  Restrictions/Precautions:    Medical/Treatment Diagnosis Information:  · Diagnosis: S83.211A Buckethandle meniscus tear R knee  Sx 17  · Treatment Diagnosis: S83.211A R knee meniscus buckethandle tear, M25.561 R knee pain, MM25.661 R knee stiffness  Insurance/Certification information:  PT Insurance Information: [employer] - 30 visits  Physician Information:  Referring Practitioner: Dr. Lizz Patel of care signed (Y/N): due 2017    Date of Patient follow up with Physician:     G-Code (if applicable):      Date G-Code Applied:         Progress Note: [x]  Yes  []  No  Next due by: Visit #10       Latex Allergy:  [x]NO      []YES  Preferred Language for Healthcare:   [x]English       []other:    Visit # Insurance Allowable Requires auth   7 30    [x]no        []yes:       Pain level:  0/10 right knee     SUBJECTIVE: seePOC    OBJECTIVE: see POC   Observation:   Test measurements:    RESTRICTIONS/PRECAUTIONS: Pt did get dizzy 2x during initial evaluation    Exercises/Interventions:     Therapeutic Ex Sets/sec Reps Notes   Stairs NV     Bike  5 min      Gastroc Stretch on Incline H30x3     Seated HS Stool Walks 2 laps     Seated HS Stretch @ mat's Edge H60x2     Sidestepping TB @ Ankles GTB 2laps     LSU 6\" 2x10     Standing Modified Quad HF Stretch mat's edge w/ chair H15 x3     FSU 4\" 3x10           SL hip abd w/ ext into wall 1.5 # 3\" 2x10      Bridge w/ ME H5 2x10     Prone Quad Stretch w/ [id number] 2017   SAQ 5#H5 3x10  Hep [id number]   Supine ITB stretch 4x20\"   SLR  1# H5 2x10   SL clams  GTB 3\" 2x10                        Manual Intervention      Pat JM, rolling HF/quad/IT Band Stretching, knee flex-ext ROM 15 min                                   NMR re-education      BOSU Post step back   2x10      Reverse BOSU Mini Squat w/ ball lift 4# H5 x20     SLS BOSU  5\"2x10     LSD  4\" 2x10                Therapeutic Exercise and NMR EXR  [x] (06044) Provided verbal/tactile cueing for activities related to strengthening, flexibility, endurance, ROM for improvements in LE, proximal hip, and core control with self care, mobility, lifting, ambulation.  [] (82123) Provided verbal/tactile cueing for activities related to improving balance, coordination, kinesthetic sense, posture, motor skill, proprioception  to assist with LE, proximal hip, and core control in self care, mobility, lifting, ambulation and eccentric single leg control.      NMR and Therapeutic Activities:    [] (90670 or 50382) Provided verbal/tactile cueing for activities related to improving balance, coordination, kinesthetic sense, posture, motor skill, proprioception and motor activation to allow for proper function of core, proximal hip and LE with self care and ADLs  [] (34726) Gait Re-education- Provided training and instruction to the patient for proper LE, core and proximal hip recruitment and positioning and eccentric body weight control with ambulation re-education including up and down stairs     Home Exercise Program:    [x] (26429) Reviewed/Progressed HEP activities related to strengthening, flexibility, endurance, ROM of core, proximal hip and LE for functional self-care, mobility, lifting and ambulation/stair navigation   [x] (85105)Reviewed/Progressed HEP activities related to improving balance, coordination, kinesthetic sense, posture, motor skill, proprioception of core, proximal hip and LE for self care, mobility, lifting, and ambulation/stair navigation      Manual Treatments:  PROM / STM / Oscillations-Mobs:  G-I, II, III, IV (PA's, Inf., Post.)  [x] (59356) Provided manual therapy to mobilize LE, proximal hip and/or LS spine soft

## 2017-12-11 NOTE — FLOWSHEET NOTE
[facility],  [address] [facility] [place]  Phone: [phone]  Fax [phone]      ATHLETIC TRAINING [address]  Date:  2017    Patient Name:  Jazz Duty  \"Dub\" :  1960  MRN: [id number]  Restrictions/Precautions:    Medical/Treatment Diagnosis Information:  ·  R knee bucket handle meniscus tear s/p R knee PMM  ·  R knee pain, stiffness  Physician Information:   Dr. Dylan Sage Post-op  8 wks  12 wks 16 wks 20 wks   24 wks                            Activity Log                                                  DOS/DOI: 17                                                   Date: 17   ATC communication Still some irritation behind patella w going down steps. Post  Reached 114 flex w/PT today. Post EZS flex 117  Pt had 5 minutes for exercise   Bike       Elliptical       Treadmill       Airdyne              Gastroc stretch       Soleus stretch       Hamstring stretch       ITB stretch       Hip Flexor stretch       Quad stretch EZS flex 5' EZS flex 5'     Adductor stretch              Weight Shifting sp                                 fp                                 tp       Lateral walking (with/w/o TB)              Balance: PEP/Jes board                      SLS             Star excursion load/explode             Extremity reach UE/LE              Leg Press Kevin. 90# 3x10 90# ball add 3x10 100# w/add 2x10                       Ecc. 70# 2x10 70# 3x10 80# 2x10                       Inv.               Calf Press Kevin. stand w/PT 90# 2x10 100# 2x10                        Ecc.                           Inv.              KARINA   Flex                  ABd 45# R/L 2x10 45# R/L 2x10 45# R/L 2x10  60# R/L 2x10              ADd                 TKE 60# 20x5\" 60# 20x5\" 60# 20x5\"                Ext  60# R/L 2x10 60# R/L 2x10  60# R/L 2x10           Steps Up FSU/LSU 6\" 2x10 steps w/PT W/PT                Up and Over                  Down 4\" post reach 10x                 Lateral 4\" LSD 10x                 Rotation              Squats  mini                     wall                    BOSU              Lunges:  Lunge to Balance                      Balance to Lunge                      Walking              Knee Extension Bilat. Ecc.                                  Inv. Hamstring Curls Bilat. 35# (south) 2x10 40# 3x10 40# 3x10                                Ecc.                                  Inv.              Soleus Press Bilat. Ecc.                              Inv.                                      Ladders                   Square                  Jump/Hop  Low                         Med.                         High                                                                     Modality ES/VPulse 15' ES/VPulse 15' ES/Vpulse 15' Declined   Initials                             JLW JLW SA  BMG

## 2017-12-11 NOTE — PROGRESS NOTES
History of present illness: The patient returns today for a followup after right knee arthroscopy performed on 11/16/2017. Pain control has been satisfactory with oral medications. They have completed formal physical therapy. Physical examination: Incisions are well healed with no signs of infection. The patient demonstrates full active range of motion. Quad tone is adequate. Normal gait without the use of ambulatory devices. Assessment/plan: The patient is doing well after knee arthroscopy. They have no complaints. I recommend continued home therapy exercises and a return to normal activity. Follow up as needed. Kathleene Lanes, [facility]    This dictation was performed with a verbal recognition program [employer]) and it was checked for errors. It is possible that there are still dictated errors within this office note. If so, please bring any errors to my attention for an addendum. All efforts were made to ensure that this office note is accurate.

## 2017-12-11 NOTE — PLAN OF CARE
[facility], [address]  Phone: [phone]  Fax [phone]     Physical Therapy Re-Certification Plan of Care    Dear  Harjinder Morales,    We had the pleasure of treating the following patient for physical therapy services at [address] [place] [address]. A summary of our findings can be found in the updated assessment below. This includes our plan of care. If you have any questions or concerns regarding these findings, please do not hesitate to contact me at the office phone number checked above. Thank you for the referral.     Physician Signature:________________________________Date:__________________  By signing above, therapists plan is approved by physician      Patient: Vicky Canela   : 1960   MRN: [id number]  Referring Physician:  Harjinder Morales      Evaluation Date: 2017      Medical Diagnosis Information:  ·   Buckethandle meniscus tear R knee  Sx 17   ·   R knee meniscus buckethandle tear, M25.561 R knee pain, MM25.661 R knee stiffness  Insurance information:  [facility]    Date Range:17-17  Total visits:7      G-Codes: (if applicable)     Functional Index used:    SUBJECTIVE:  Pt reports he is a little sorefrom decorating this weekend.  Going up and down the stairs normally     Current Pain Scale: 0/10    Type: []Constant   []Intermitment  []Radiating []Localized  []other:     Functional Limitations: []Sitting []Standing []Walking    []Squatting []Stairs            []ADL's  []Driving []Sports/Recreation  []Other:      OBJECTIVE:  Knee ext to flex ROM  0° to 118°  MMT hip flex 5 , hip abd 4+   Knee ext  5  Knee flex  5    Gait: normal without AD     Joint mobility:    [x]Normal    []Hypo   []Hyper    Palpation: non tender with palp    Orthopedic Tests: NT     OTHER: pocket of edema sup lateral to knee       ASSESSMENT: increased R knee RO,increased R LE Strength, normal gait without AD Response to Treatment:   [x]Patient is responding well to treatment and improvement is noted with regards  to goals   []Patient should continue to improve in reasonable time if they continue HEP   []Patient has plateaued and is no longer responding to skilled PT intervention    []Patient is getting worse and would benefit from return to referring MD   []Patient unable to adhere to initial POC    Functional deficiencies/Impairements which affect ADL's and Reduce overall function:     [x]decreased core/proximal hip strength and neuromuscular control - Reduced  overall function   []decreased LE ROM/joint mobility- Reduced overall Function    []decreased LE strength- Reduced overall function with gait and steps   []difficulty with SLS- Reduced overall function and possible falls risk   []pain/difficulty with ambulation- reduced overall function and mobility   []unable to perform sport/recreational activity due to pain and dysfunction   []other:       Prognosis/Rehab Potential:    []Excellent   [x]Good    []Fair   []Poor: Toleration of evaluation or treatment:    []Excellent   [x]Good    []Fair   []Poor     New or Updated Goals (if applicable):  [x] No change to goals established upon initial eval/last progress note:  New Goals:    GOALS:   Short Term Goals: To be achieved in: 2 weeks  1. Independent in HEP and progression per patient tolerance, in order to prevent re-injury. met  2. Patient will have a decrease in pain to facilitate improvement in movement, function, and ADLs as indicated by Functional Deficits. met     Long Term Goals: To be achieved in: 4 weeks  1. Disability index score of 35% or less for the LEFS to assist with reaching prior level of function. met  2. Patient will demonstrate increased AROM to  and ext:-5 from zero to allow for proper joint functioning as indicated by patients Functional Deficits. LTG adjusted to 115° flexion on 12-1-2017. Met    3.  Patient will demonstrate an increase in

## 2017-12-13 ENCOUNTER — HOSPITAL ENCOUNTER (OUTPATIENT)
Dept: PHYSICAL THERAPY | Age: 57
Discharge: HOME | End: 2017-12-13
Admitting: ORTHOPAEDIC SURGERY

## 2017-12-13 NOTE — FLOWSHEET NOTE
[facility], [address]  Phone: [phone]  Fax [phone]    Physical Therapy Daily Treatment Note  Date:  2017    Patient Name:  Mariaa Croft  \"Dubs\"  :  1960  MRN: [id number]  Restrictions/Precautions:    Medical/Treatment Diagnosis Information:  · Diagnosis: S83.211A Buckethandle meniscus tear R knee  Sx 17  · Treatment Diagnosis: S83.211A R knee meniscus buckethandle tear, M25.561 R knee pain, MM25.661 R knee stiffness  Insurance/Certification information:  [facility]  Physician Information:  Referring Practitioner: Dr. Lizz Patel of care signed (Y/N):     Date of Patient follow up with Physician:     G-Code (if applicable):      Date G-Code Applied:    PT G-Codes  Functional Assessment Tool Used: LEFS   Score: 33%  Functional Limitation: Mobility: Walking and moving around  Mobility: Walking and Moving Around Current Status (): At least 20 percent but less than 40 percent impaired, limited or restricted  Mobility: Walking and Moving Around Goal Status (): At least 20 percent but less than 40 percent impaired, limited or restricted  Mobility: Walking and Moving Around Discharge Status ():  At least 20 percent but less than 40 percent impaired, limited or restricted    Progress Note: [x]  Yes  []  No  Next due by: Visit #10       Latex Allergy:  [x]NO      []YES  Preferred Language for Healthcare:   [x]English       []other:    Visit # Insurance Allowable Requires auth   8 30    [x]no        []yes:       Pain level:  0/10 right knee     SUBJECTIVE:  See d/c note     OBJECTIVE: see d/c note   Observation:   Test measurements:    RESTRICTIONS/PRECAUTIONS: Pt did get dizzy 2x during initial evaluation    Exercises/Interventions:     Therapeutic Ex Sets/sec Reps Notes         Bike  5 min      Gastroc Stretch on Incline H30x3     Seated HS Stool ROM of core, proximal hip and LE for functional self-care, mobility, lifting and ambulation/stair navigation   [x] (81537)Reviewed/Progressed HEP activities related to improving balance, coordination, kinesthetic sense, posture, motor skill, proprioception of core, proximal hip and LE for self care, mobility, lifting, and ambulation/stair navigation      Manual Treatments:  PROM / STM / Oscillations-Mobs:  G-I, II, III, IV (PA's, Inf., Post.)  [x] (30539) Provided manual therapy to mobilize LE, proximal hip and/or LS spine soft tissue/joints for the purpose of modulating pain, promoting relaxation,  increasing ROM, reducing/eliminating soft tissue swelling/inflammation/restriction, improving soft tissue extensibility and allowing for proper ROM for normal function with self care, mobility, lifting and ambulation. Modalities:  HV ES/CP right knee- 15 min     Charges:  Timed Code Treatment Minutes: 45   Total Treatment Minutes: 60     [] EVAL (LOW) 01095 (typically 20 minutes face-to-face)  [] EVAL (MOD) 45153 (typically 30 minutes face-to-face)  [] EVAL (HIGH) 91384 (typically 45 minutes face-to-face)  [] RE-EVAL     [x] OR(76024) x  2   [] IONTO  [x] NMR (96636) x  1   [] VASO  [] Manual (15100) x       [] Other:  [] TA x       [] Mech Traction (25058)  [] ES(attended) (60201)      [] ES (un) (57197):     GOALS: Patient stated goal: Be ready to take slick to [place] and build strength in the knee      Therapist goals for Patient:   Short Term Goals: To be achieved in: 2 weeks  1. Independent in HEP and progression per patient tolerance, in order to prevent re-injury. met  2. Patient will have a decrease in pain to facilitate improvement in movement, function, and ADLs as indicated by Functional Deficits. met     Long Term Goals: To be achieved in: 4 weeks  1. Disability index score of 35% or less for the LEFS to assist with reaching prior level of function. met  2.  Patient will demonstrate increased AROM to  and ext:-5 from zero to allow for proper joint functioning as indicated by patients Functional Deficits. LTG adjusted to 115° flexion on 12-1-2017. Met    3. Patient will demonstrate an increase in Strength to good proximal hip strength and control, within 5lb HHD in LE to allow for proper functional mobility as indicated by patients Functional Deficits. 4. Patient will return to walking and golf without increased symptoms or restriction. improving  5. Pt will demonstrate a proper gait pattern without assistive device and will be able to demonstrate a TUG in under 12 sec. Met 12-1-2017    Progression Towards Functional goals:  [x] Patient is progressing as expected towards functional goals listed. [] Progression is slowed due to complexities listed. [] Progression has been slowed due to co-morbidities.   [] Plan just implemented, too soon to assess goals progression  [] Other:     ASSESSMENT: see d/c note     Treatment/Activity Tolerance:  [x] Patient tolerated treatment well [] Patient limited by fatique  [] Patient limited by pain  [] Patient limited by other medical complications  [] Other:     Prognosis: [x] Good [] Fair  [] Poor    Patient Requires Follow-up: [x] Yes  [] No    PLAN:  [] Continue per plan of care [] Alter current plan (see comments)  [] Plan of care initiated [] Hold pending MD visit [x] Discharge    Electronically signed by: Lisseth Gamboa [place] [id number]

## 2017-12-13 NOTE — DISCHARGE SUMMARY
[]                       ?           []   Assessment:  [x] All Goals were achieved. [] The following goals were achieved (#'s):  [] The following goals were not achieved for the following reasons:  Summary/assessmen of improvement as it relates to each goal:    Plan of Care:  [x] Discharge from Therapy Services due to:    Reason for Discharge:   [x] All goals achieved    [] Patient having surgery  [] Physician discontinued therapy  [] Insurance/Financial Limitations [] Patient did not return for follow up visits [] Home program/1 visit only   [] No subjective or objective improvement [] Plateaued   [] Patient was unable to adhere to the plan of care established at evaluation. [] Referred back to physician for re-evaluation and did not return.      [] Other:?       Electronically signed by:  Kevin Brown PT

## 2018-01-01 ENCOUNTER — HOSPITAL ENCOUNTER (OUTPATIENT)
Dept: PHYSICAL THERAPY | Age: 58
Discharge: HOME | End: 2018-01-31
Attending: ORTHOPAEDIC SURGERY | Admitting: ORTHOPAEDIC SURGERY

## 2019-08-13 ENCOUNTER — HOSPITAL ENCOUNTER (EMERGENCY)
Age: 59
Discharge: HOME | End: 2019-08-13
Attending: EMERGENCY MEDICINE
Payer: COMMERCIAL

## 2019-08-13 VITALS
TEMPERATURE: 97.8 F | OXYGEN SATURATION: 96 % | WEIGHT: 215 LBS | SYSTOLIC BLOOD PRESSURE: 119 MMHG | RESPIRATION RATE: 14 BRPM | DIASTOLIC BLOOD PRESSURE: 79 MMHG | HEART RATE: 62 BPM | BODY MASS INDEX: 27.59 KG/M2 | HEIGHT: 74 IN

## 2019-08-13 DIAGNOSIS — R20.2 PARESTHESIAS: Primary | ICD-10-CM

## 2019-08-13 DIAGNOSIS — R20.0 NUMBNESS: ICD-10-CM

## 2019-08-13 LAB
ANION GAP SERPL CALCULATED.3IONS-SCNC: 12 MMOL/L (ref 3–16)
BASOPHILS ABSOLUTE: 0.1 K/UL (ref 0–0.2)
BASOPHILS RELATIVE PERCENT: 0.6 %
BUN BLDV-MCNC: 13 MG/DL (ref 7–20)
CALCIUM SERPL-MCNC: 9.3 MG/DL (ref 8.3–10.6)
CHLORIDE BLD-SCNC: 102 MMOL/L (ref 99–110)
CO2: 22 MMOL/L (ref 21–32)
CREAT SERPL-MCNC: 0.8 MG/DL (ref 0.9–1.3)
EOSINOPHILS ABSOLUTE: 0.2 K/UL (ref 0–0.6)
EOSINOPHILS RELATIVE PERCENT: 2.1 %
GFR AFRICAN AMERICAN: >60
GFR NON-AFRICAN AMERICAN: >60
GLUCOSE BLD-MCNC: 87 MG/DL
GLUCOSE BLD-MCNC: 87 MG/DL (ref 70–99)
GLUCOSE BLD-MCNC: 92 MG/DL (ref 70–99)
HCT VFR BLD CALC: 43.7 % (ref 40.5–52.5)
HEMOGLOBIN: 14.9 G/DL (ref 13.5–17.5)
LYMPHOCYTES ABSOLUTE: 4 K/UL (ref 1–5.1)
LYMPHOCYTES RELATIVE PERCENT: 42 %
MCH RBC QN AUTO: 30.1 PG (ref 26–34)
MCHC RBC AUTO-ENTMCNC: 34.1 G/DL (ref 31–36)
MCV RBC AUTO: 88.2 FL (ref 80–100)
MONOCYTES ABSOLUTE: 0.7 K/UL (ref 0–1.3)
MONOCYTES RELATIVE PERCENT: 7.7 %
NEUTROPHILS ABSOLUTE: 4.5 K/UL (ref 1.7–7.7)
NEUTROPHILS RELATIVE PERCENT: 47.6 %
PDW BLD-RTO: 13.6 % (ref 12.4–15.4)
PERFORMED ON: NORMAL
PLATELET # BLD: 220 K/UL (ref 135–450)
PMV BLD AUTO: 8.5 FL (ref 5–10.5)
POTASSIUM SERPL-SCNC: 3.9 MMOL/L (ref 3.5–5.1)
RBC # BLD: 4.96 M/UL (ref 4.2–5.9)
SODIUM BLD-SCNC: 136 MMOL/L (ref 136–145)
WBC # BLD: 9.5 K/UL (ref 4–11)

## 2019-08-13 PROCEDURE — 85025 COMPLETE CBC W/AUTO DIFF WBC: CPT

## 2019-08-13 PROCEDURE — 99283 EMERGENCY DEPT VISIT LOW MDM: CPT

## 2019-08-13 PROCEDURE — 80048 BASIC METABOLIC PNL TOTAL CA: CPT

## 2019-08-13 ASSESSMENT — ENCOUNTER SYMPTOMS
DIARRHEA: 0
WHEEZING: 0
VOMITING: 0
RHINORRHEA: 0
ABDOMINAL PAIN: 0
NAUSEA: 0
PHOTOPHOBIA: 0
BACK PAIN: 0
SHORTNESS OF BREATH: 0
COUGH: 0

## 2019-08-14 NOTE — ED PROVIDER NOTES
HPI  Patient is a 33-year-old male with history of hypertension, hyperlipidemia, who presents with paresthesias of bilateral lower extremities and right upper extremity progressive over the past 3 weeks. Patient states he has not had the symptoms prior to onset. Describes pins and needle sensation that started in his right toes and then evolved to left toes and right distal fingertips and no radicular pattern. Patient denies ever having any weakness. No other symptoms he describes. Normal gait. Patient has no history of diabetes. Reports 70 pound weight loss over the past year however he attributes this to aggressive exercising and dieting. Patient denies any back pain or urinary incontinence, no recent viral illness. Patient denies any new supplements or naturopathic medications. Review of Systems   Constitutional: Negative for chills and fever. HENT: Negative for congestion and rhinorrhea. Eyes: Negative for photophobia and visual disturbance. Respiratory: Negative for cough, shortness of breath and wheezing. Cardiovascular: Negative for chest pain and palpitations. Gastrointestinal: Negative for abdominal pain, diarrhea, nausea and vomiting. Genitourinary: Negative for dysuria and hematuria. Musculoskeletal: Negative for back pain and neck pain. Skin: Negative for rash and wound. Neurological: Positive for numbness. Negative for syncope and weakness. Psychiatric/Behavioral: Negative for agitation and confusion. Physical Exam   Constitutional: He is oriented to person, place, and time. He appears well-developed. No distress. HENT:   Head: Normocephalic and atraumatic. Eyes: Pupils are equal, round, and reactive to light. EOM are normal.   Neck: Normal range of motion. Neck supple. Cardiovascular: Normal rate and regular rhythm. No murmur heard. Pulmonary/Chest: Effort normal and breath sounds normal.   Abdominal: Soft. He exhibits no distension.  There is no tenderness. Musculoskeletal: Normal range of motion. He exhibits no deformity. Neurological: He is alert and oriented to person, place, and time. No cranial nerve deficit. He exhibits normal muscle tone. Strength 5 out of 5 all extremities. Skin: Skin is warm. No rash noted. Psychiatric: He has a normal mood and affect. His behavior is normal.   Nursing note and vitals reviewed. Procedures    MDM  Number of Diagnoses or Management Options  Numbness:   Paresthesias:   Diagnosis management comments: 53-year-old male generally healthy who presents for multiple paresthesias progressive for the past 3 weeks. On exam he is well-appearing, his vitals are within normal limits. On exam has no objective neurological deficits. He endorses paresthesias over the toes bilaterally however this does not appear to follow any radicular pattern. Is not associated with any weakness. Normal electrolytes no evidence of microcytic anemia or other lab abnormality. I recommended the patient follow-up with his primary doctor within the next week and return precautions were discussed.     Results for orders placed or performed during the hospital encounter of 08/13/19   CBC auto differential   Result Value Ref Range    WBC 9.5 4.0 - 11.0 K/uL    RBC 4.96 4.20 - 5.90 M/uL    Hemoglobin 14.9 13.5 - 17.5 g/dL    Hematocrit 43.7 40.5 - 52.5 %    MCV 88.2 80.0 - 100.0 fL    MCH 30.1 26.0 - 34.0 pg    MCHC 34.1 31.0 - 36.0 g/dL    RDW 13.6 12.4 - 15.4 %    Platelets 918 197 - 575 K/uL    MPV 8.5 5.0 - 10.5 fL    Neutrophils % 47.6 %    Lymphocytes % 42.0 %    Monocytes % 7.7 %    Eosinophils % 2.1 %    Basophils % 0.6 %    Neutrophils # 4.5 1.7 - 7.7 K/uL    Lymphocytes # 4.0 1.0 - 5.1 K/uL    Monocytes # 0.7 0.0 - 1.3 K/uL    Eosinophils # 0.2 0.0 - 0.6 K/uL    Basophils # 0.1 0.0 - 0.2 K/uL   Basic metabolic panel   Result Value Ref Range    Sodium 136 136 - 145 mmol/L    Potassium 3.9 3.5 - 5.1 mmol/L    Chloride 102 99 - 110

## 2021-03-30 ENCOUNTER — OFFICE VISIT (OUTPATIENT)
Dept: ORTHOPEDIC SURGERY | Age: 61
End: 2021-03-30

## 2021-03-30 VITALS — HEIGHT: 74 IN | BODY MASS INDEX: 27.59 KG/M2 | WEIGHT: 215 LBS

## 2021-03-30 DIAGNOSIS — S83.241A TEAR OF MEDIAL MENISCUS OF RIGHT KNEE, CURRENT, UNSPECIFIED TEAR TYPE, INITIAL ENCOUNTER: ICD-10-CM

## 2021-03-30 DIAGNOSIS — M17.11 PRIMARY OSTEOARTHRITIS OF RIGHT KNEE: ICD-10-CM

## 2021-03-30 DIAGNOSIS — M25.561 RIGHT KNEE PAIN, UNSPECIFIED CHRONICITY: Primary | ICD-10-CM

## 2021-03-30 DIAGNOSIS — M94.261 CHONDROMALACIA, RIGHT KNEE: ICD-10-CM

## 2021-03-30 PROCEDURE — 99204 OFFICE O/P NEW MOD 45 MIN: CPT | Performed by: ORTHOPAEDIC SURGERY

## 2021-03-30 PROCEDURE — 20610 DRAIN/INJ JOINT/BURSA W/O US: CPT | Performed by: ORTHOPAEDIC SURGERY

## 2021-03-30 RX ORDER — ROSUVASTATIN CALCIUM 40 MG/1
40 TABLET, COATED ORAL DAILY
COMMUNITY
Start: 2020-12-04

## 2021-03-30 RX ORDER — BUPIVACAINE HYDROCHLORIDE 2.5 MG/ML
10 INJECTION, SOLUTION INFILTRATION; PERINEURAL ONCE
Status: COMPLETED | OUTPATIENT
Start: 2021-03-30 | End: 2021-03-30

## 2021-03-30 RX ORDER — TRIAMCINOLONE ACETONIDE 40 MG/ML
80 INJECTION, SUSPENSION INTRA-ARTICULAR; INTRAMUSCULAR ONCE
Status: COMPLETED | OUTPATIENT
Start: 2021-03-30 | End: 2021-03-30

## 2021-03-30 RX ORDER — LIDOCAINE HYDROCHLORIDE 10 MG/ML
40 INJECTION, SOLUTION INFILTRATION; PERINEURAL ONCE
Status: COMPLETED | OUTPATIENT
Start: 2021-03-30 | End: 2021-03-30

## 2021-03-30 RX ADMIN — BUPIVACAINE HYDROCHLORIDE 10 MG: 2.5 INJECTION, SOLUTION INFILTRATION; PERINEURAL at 08:42

## 2021-03-30 RX ADMIN — TRIAMCINOLONE ACETONIDE 80 MG: 40 INJECTION, SUSPENSION INTRA-ARTICULAR; INTRAMUSCULAR at 08:42

## 2021-03-30 RX ADMIN — LIDOCAINE HYDROCHLORIDE 40 MG: 10 INJECTION, SOLUTION INFILTRATION; PERINEURAL at 08:43
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
## 2021-03-31 ENCOUNTER — HOSPITAL ENCOUNTER (OUTPATIENT)
Dept: PHYSICAL THERAPY | Age: 61
Setting detail: THERAPIES SERIES
Discharge: HOME | End: 2021-03-31
Payer: COMMERCIAL

## 2021-03-31 PROCEDURE — 97110 THERAPEUTIC EXERCISES: CPT

## 2021-03-31 PROCEDURE — 97161 PT EVAL LOW COMPLEX 20 MIN: CPT
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
## 2021-03-31 NOTE — FLOWSHEET NOTE
[address] [place] [address] and Sports Rehabilitation[address], [address] [place] [address] [address]  Phone: [phone]   Fax:     [phone]      Physical Therapy Treatment Note/ Progress Report:     Date:  3/31/2021    Patient Name:  Parminder Ozuna    :  1960  MRN: [id number]  Restrictions/Precautions:    Medical/Treatment Diagnosis Information:  · Diagnosis: Right knee pain M25.561  · Treatment Diagnosis: Right knee pain S47.169  Insurance/Certification information:  PT Insurance Information: [employer]  Physician Information:  Referring Practitioner: Dr. Migue Cummings  Has the plan of care been signed (Y/N):        []  Yes  [x]  No     Date of Patient follow up with Physician: 21    Is this a Progress Report:     []  Yes  [x]  No      If Yes:  Date Range for reporting period:  Beginning: 3/31/21 ------------ Endin    Progress report will be due (10 Rx or 30 days whichever is less):       Recertification will be due (POC Duration  / 90 days whichever is less): 21       Visit # Insurance Allowable Auth Required   In Person 1 30 []  Yes     []  No    Tele Health 0  []  Yes     []  No    Total 1       Functional Scale: LEFS 74%   Date assessed:  3/31/21     Latex Allergy:  [x]NO      []YES  Preferred Language for Healthcare:   [x]English       []other:    Pain level:  2-8/10     SUBJECTIVE:  See eval    OBJECTIVE: See eval   Observation:    Test measurements:      RESTRICTIONS/PRECAUTIONS: OA, hx R knee scope 2019    Exercises/Interventions:   Therapeutic Ex (55132) Sets/sec Reps Notes/CUES HEP   Calf stretch belt 30\" 3 vc    HS stretch long sit 30\" 3 vc    Heel slides flex 10\" 10 vc    Quad sets 10\" 10 vc    SLR  15 vc    TKE  10 Lime, vc    HR's  15 vc                                Pt education 10'  HEP with gradual progression, goals of PT, not to push through pain with ex, use of ice- pt stated understanding    Manual Intervention ([address] [facility]) manual therapy to mobilize LE, proximal hip and/or LS spine soft tissue/joints for the purpose of modulating pain, promoting relaxation, increasing ROM, reducing/eliminating soft tissue swelling/inflammation/restriction, improving soft tissue extensibility and allowing for proper ROM for normal function with self-care, mobility, lifting and ambulation. Modalities:  CP 10'   [] GAME READY (VASO)- for significant edema, swelling, pain control. Charges:  Timed Code Treatment Minutes: 25   Total Treatment Minutes:  40   BWC:  TE TIME:  NMR TIME:  MANUAL TIME:  UNTIMED MINUTES:  Medicare Total:       [x] EVAL (LOW) 27593 (typically 20 minutes face-to-face)  [] EVAL (MOD) 65662 (typically 30 minutes face-to-face)  [] EVAL (HIGH) 03618 (typically 45 minutes face-to-face)  [] RE-EVAL     [x] AY(72567) x  2   [] IONTO  [] NMR (77776) x     [] VASO  [] Manual (73208) x     [] Other:  [] TA x      [] Mech Traction (70449)  [] ES(attended) (63630)      [] ES (un) (40876):    ASSESSMENT:  See eval    GOALS:     Patient stated goal: Less pain with ADL's. Therapist goals for Patient:   Short Term Goals: To be achieved in: 2 weeks  1. Independent in HEP and progression per patient tolerance, in order to prevent re-injury. [] Progressing: [] Met: [] Not Met: [] Adjusted     2. Patient will have a decrease in pain to facilitate improvement in movement, function, and ADLs as indicated by Functional Deficits. [] Progressing: [] Met: [] Not Met: [] Adjusted     Long Term Goals: To be achieved in: 8 weeks  1. Disability index score of 37% or less for the LEFS to assist with reaching prior level of function. [] Progressing: [] Met: [] Not Met: [] Adjusted     2. Patient will demonstrate increased AROM to 0-127 to allow for proper joint functioning as indicated by patients Functional Deficits. [] Progressing: [] Met: [] Not Met: [] Adjusted     3.  Patient will demonstrate an increase in Strength to good proximal hip strength and control, within 5lb HHD in LE to allow for proper functional mobility as indicated by patients Functional Deficits. [] Progressing: [] Met: [] Not Met: [] Adjusted     4. Patient will return to functional activities without increased symptoms or restriction. [] Progressing: [] Met: [] Not Met: [] Adjusted     5. Patient will perform ADL's I no AD without increased symptoms or restriction (patient specific functional goal)    [] Progressing: [] Met: [] Not Met: [] Adjusted        Overall Progression Towards Functional goals/ Treatment Progress Update:  [] Patient is progressing as expected towards functional goals listed. [] Progression is slowed due to complexities/Impairments listed. [] Progression has been slowed due to co-morbidities.   [x] Plan just implemented, too soon to assess goals progression <30days   [] Goals require adjustment due to lack of progress  [] Patient is not progressing as expected and requires additional follow up with physician  [] Other    Prognosis for POC: [x] Good [] Fair  [] Poor      Patient requires continued skilled intervention: [x] Yes  [] No    Treatment/Activity Tolerance:  [x] Patient able to complete treatment  [] Patient limited by fatigue  [] Patient limited by pain    [] Patient limited by other medical complications  [] Other:     Return to Play: (if applicable)   []  Stage 1: Intro to Strength   []  Stage 2: Return to Run and Strength   []  Stage 3: Return to Jump and Strength   []  Stage 4: Dynamic Strength and Agility   []  Stage 5: Sport Specific Training     []  Ready to Return to Play, Meets All Above Stages   []  Not Ready for Return to Sports   Comments:                          PLAN: See eval  [] Continue per plan of care [] Alter current plan (see comments above)  [x] Plan of care initiated [] Hold pending MD visit [] Discharge    Electronically signed by:  Maxi Null PT    Note: If patient does not return for scheduled/ recommended follow up visits, this note will serve as a discharge from care along with most recent update on progress.

## 2021-04-08 ENCOUNTER — HOSPITAL ENCOUNTER (OUTPATIENT)
Dept: PHYSICAL THERAPY | Age: 61
Discharge: HOME | End: 2021-04-08
Payer: COMMERCIAL

## 2021-04-08 PROCEDURE — 97110 THERAPEUTIC EXERCISES: CPT

## 2021-04-08 PROCEDURE — 97112 NEUROMUSCULAR REEDUCATION: CPT

## 2021-04-08 NOTE — FLOWSHEET NOTE
[address] [facility], [address] [place] [address] [address]  Phone: [phone]   Fax:     [phone]      Physical Therapy Treatment Note/ Progress Report:     Date:  2021    Patient Name:  Barb Marie    :  1960  MRN: [id number]  Restrictions/Precautions:    Medical/Treatment Diagnosis Information:  · Diagnosis: Right knee pain M25.561  · Treatment Diagnosis: Right knee pain W27.344  Insurance/Certification information:  PT Insurance Information: Corin  Physician Information:  Referring Practitioner: Dr. Estela Topete  Has the plan of care been signed (Y/N):        []  Yes  [x]  No     Date of Patient follow up with Physician: 21    Is this a Progress Report:     []  Yes  [x]  No      If Yes:  Date Range for reporting period:  Beginning: 3/31/21 ------------ Endin    Progress report will be due (10 Rx or 30 days whichever is less):       Recertification will be due (POC Duration  / 90 days whichever is less): 21       Visit # Insurance Allowable Auth Required   In Person 2 30 []  Yes     []  No    Tele Health 0  []  Yes     []  No    Total 2       Functional Scale: LEFS 74%   Date assessed:  3/31/21     Latex Allergy:  [x]NO      []YES  Preferred Language for Healthcare:   [x]English       []other:    Pain level:  2-8/10     SUBJECTIVE:    Pt reports good days and bad days. Stated has been able to get to 30 reps with leg lifts. Stated having some painful popping when he tries to bend knee at times.     OBJECTIVE: See eval   Observation:    Test measurements:  Lacking 4 fr 0 (lacking 2 fr 0)- 112     RESTRICTIONS/PRECAUTIONS: OA, hx R knee scope 2019    Exercises/Interventions:   Therapeutic Ex (05509) Sets/sec Reps Notes/CUES HEP   Bike   Calf stretch belt 6'  30\"   3   vc    HS stretch long sit 30\" 3 vc    Heel slides flex 10\" 10 vc    Quad sets 10\"  vc    SLR 10\" 30 vc    TKE  30 Lime, vc    HR's  30 vc Bridge 10\" 10 vc    Standing hip abd/ext  10ea BLE     SLS 5-10\" 5 vc           Pt education 8'  HEP with gradual progression, goals of PT, not to push through pain with ex, use of ice- pt stated understanding    Manual Intervention ([id number])       Lat to med patella mobs 5'                                         NMR re-education (83304)   CUES NEEDED                                                                   Therapeutic Activity (94385)                                          G[employer] access code:  [email or web address]; [email or web address]           Therapeutic Exercise and NMR EXR  [x] (62039) Provided verbal/tactile cueing for activities related to strengthening, flexibility, endurance, ROM for improvements in LE, proximal hip, and core control with self care, mobility, lifting, ambulation. [x] (34916) Provided verbal/tactile cueing for activities related to improving balance, coordination, kinesthetic sense, posture, motor skill, proprioception to assist with LE, proximal hip, and core control in self-care, mobility, lifting, ambulation and eccentric single leg control.      NMR and Therapeutic Activities:    [x] (09991 or 80156) Provided verbal/tactile cueing for activities related to improving balance, coordination, kinesthetic sense, posture, motor skill, proprioception and motor activation to allow for proper function of core, proximal hip and LE with self-care and ADLs and functional mobility.   [] (94607) Gait Re-education- Provided training and instruction to the patient for proper LE, core and proximal hip recruitment and positioning and eccentric body weight control with ambulation re-education including up and down stairs     Home Exercise Program:    [x] (22285) Reviewed/Progressed HEP activities related to strengthening, flexibility, endurance, ROM of core, proximal hip and LE for functional self-care, mobility, lifting and ambulation/stair navigation   [] (59472) Reviewed/Progressed HEP activities related to improving balance, coordination, kinesthetic sense, posture, motor skill, proprioception of core, proximal hip and LE for self-care, mobility, lifting, and ambulation/stair navigation      Manual Treatments:  PROM / STM / Oscillations-Mobs:  G-I, II, III, IV (PA's, Inf., Post.)  [] (97561) Provided manual therapy to mobilize LE, proximal hip and/or LS spine soft tissue/joints for the purpose of modulating pain, promoting relaxation, increasing ROM, reducing/eliminating soft tissue swelling/inflammation/restriction, improving soft tissue extensibility and allowing for proper ROM for normal function with self-care, mobility, lifting and ambulation. Modalities:  CP 10'   [] GAME READY (VASO)- for significant edema, swelling, pain control. Charges:  Timed Code Treatment Minutes: 40   Total Treatment Minutes:  40   BWC:  TE TIME:  NMR TIME:  MANUAL TIME:  UNTIMED MINUTES:  Medicare Total:       [] EVAL (LOW) 90209 (typically 20 minutes face-to-face)  [] EVAL (MOD) 81208 (typically 30 minutes face-to-face)  [] EVAL (HIGH) 32446 (typically 45 minutes face-to-face)  [] RE-EVAL     [x] TH(09893) x  2   [] IONTO  [x] NMR (61816) x     [] VASO  [] Manual (72768) x     [] Other:  [] TA x      [] Mech Traction (41457)  [] ES(attended) (76278)      [] ES (un) (83563):    ASSESSMENT:   Increased reps and added some ex without complaints. Reiterated not to force through pain with ex including knee flex. Ease into it and to spread reps throughout the day to improve tolerance. Stiffness with patellar mobs. Stated mild soreness at conclusion, no increased pain noted. GOALS:     Patient stated goal: Less pain with ADL's. Therapist goals for Patient:   Short Term Goals: To be achieved in: 2 weeks  1. Independent in HEP and progression per patient tolerance, in order to prevent re-injury. [] Progressing: [] Met: [] Not Met: [] Adjusted     2.  Patient will have a decrease in pain to facilitate improvement in movement, function, and ADLs as indicated by Functional Deficits. [] Progressing: [] Met: [] Not Met: [] Adjusted     Long Term Goals: To be achieved in: 8 weeks  1. Disability index score of 37% or less for the LEFS to assist with reaching prior level of function. [] Progressing: [] Met: [] Not Met: [] Adjusted     2. Patient will demonstrate increased AROM to 0-127 to allow for proper joint functioning as indicated by patients Functional Deficits. [] Progressing: [] Met: [] Not Met: [] Adjusted     3. Patient will demonstrate an increase in Strength to good proximal hip strength and control, within 5lb HHD in LE to allow for proper functional mobility as indicated by patients Functional Deficits. [] Progressing: [] Met: [] Not Met: [] Adjusted     4. Patient will return to functional activities without increased symptoms or restriction. [] Progressing: [] Met: [] Not Met: [] Adjusted     5. Patient will perform ADL's I no AD without increased symptoms or restriction (patient specific functional goal)    [] Progressing: [] Met: [] Not Met: [] Adjusted        Overall Progression Towards Functional goals/ Treatment Progress Update:  [] Patient is progressing as expected towards functional goals listed. [] Progression is slowed due to complexities/Impairments listed. [] Progression has been slowed due to co-morbidities.   [x] Plan just implemented, too soon to assess goals progression <30days   [] Goals require adjustment due to lack of progress  [] Patient is not progressing as expected and requires additional follow up with physician  [] Other    Prognosis for POC: [x] Good [] Fair  [] Poor      Patient requires continued skilled intervention: [x] Yes  [] No    Treatment/Activity Tolerance:  [x] Patient able to complete treatment  [] Patient limited by fatigue  [] Patient limited by pain    [] Patient limited by other medical complications  [] Other:     Return to Play: (if applicable)   []  Stage 1: Intro to Strength   []  Stage 2: Return to Run and Strength   []  Stage 3: Return to Jump and Strength   []  Stage 4: Dynamic Strength and Agility   []  Stage 5: Sport Specific Training     []  Ready to Return to Play, Meets All Above Stages   []  Not Ready for Return to Sports   Comments:                          PLAN: See eval  [x] Continue per plan of care [] Alter current plan (see comments above)  [] Plan of care initiated [] Hold pending MD visit [] Discharge    Electronically signed by:  Latrell Escobedo PT    Note: If patient does not return for scheduled/ recommended follow up visits, this note will serve as a discharge from care along with most recent update on progress.

## 2021-04-14 ENCOUNTER — HOSPITAL ENCOUNTER (OUTPATIENT)
Dept: PHYSICAL THERAPY | Age: 61
Discharge: HOME | End: 2021-04-14
Payer: COMMERCIAL

## 2021-04-14 PROCEDURE — 97112 NEUROMUSCULAR REEDUCATION: CPT

## 2021-04-14 PROCEDURE — 97110 THERAPEUTIC EXERCISES: CPT

## 2021-04-14 NOTE — FLOWSHEET NOTE
723 [facility] and [facility], [address] [place] [address] 3560 [facility], [address] [place] [address] [address]  Phone: [phone]   Fax:     [phone]      Physical Therapy Treatment Note/ Progress Report:     Date:  2021    Patient Name:  Tatum Cardenas    :  1960  MRN: [id number]  Restrictions/Precautions:    Medical/Treatment Diagnosis Information:  · Diagnosis: Right knee pain M25.561  · Treatment Diagnosis: Right knee pain J45.969  Insurance/Certification information:  PT Insurance Information: Corin  Physician Information:  Referring Practitioner: Dr. Kristy John  Has the plan of care been signed (Y/N):        []  Yes  [x]  No     Date of Patient follow up with Physician: 21    Is this a Progress Report:     []  Yes  [x]  No      If Yes:  Date Range for reporting period:  Beginning: 3/31/21 ------------ Endin    Progress report will be due (10 Rx or 30 days whichever is less): 26      Recertification will be due (POC Duration  / 90 days whichever is less): 21       Visit # Insurance Allowable Auth Required   In Person 3 30 []  Yes     []  No    Tele Health 0  []  Yes     []  No    Total 3       Functional Scale: LEFS 74%   Date assessed:  3/31/21     Latex Allergy:  [x]NO      []YES  Preferred Language for Healthcare:   [x]English       []other:    Pain level:  2-8/10     SUBJECTIVE:    Pt reports thinks it is a little bit better. Stated kicked a soccer ball with his granddaughter the other day and felt some pain at first with kicking but improved as he went.     OBJECTIVE: See eval   Observation:    Test measurements:  Lacking 3 fr 0 (lacking 2 fr 0)- 122     RESTRICTIONS/PRECAUTIONS: OA, hx R knee scope 2019    Exercises/Interventions:   Therapeutic Ex (94658) Sets/sec Reps Notes/CUES HEP   Bike   Calf stretch belt 6'  30\"   3   vc    HS stretch long sit 30\" 3 vc    Heel slides flex 10\" 10 vc    Quad sets 10\"  vc    SLR 10\" 30 vc       HR's  30 vc Bridge  Leg press 10\"  110# 10  3x10 vc     KARINA- abd/ext  TKE 35#  50# 2x10ea  20     SLS 10\" 5 Foam, vc           Pt education 8'  HEP with gradual progression, goals of PT, not to push through pain with ex, use of ice- pt stated understanding    Manual Intervention ([id number])       Lat to med patella mobs 5'                                         NMR re-education (05035)   CUES NEEDED                                                                   Therapeutic Activity (33792)                                          [employer] access code:  [email or web address]; [email or web address]           Therapeutic Exercise and NMR EXR  [x] (33007) Provided verbal/tactile cueing for activities related to strengthening, flexibility, endurance, ROM for improvements in LE, proximal hip, and core control with self care, mobility, lifting, ambulation. [x] (90679) Provided verbal/tactile cueing for activities related to improving balance, coordination, kinesthetic sense, posture, motor skill, proprioception to assist with LE, proximal hip, and core control in self-care, mobility, lifting, ambulation and eccentric single leg control.      NMR and Therapeutic Activities:    [x] (25536 or 10755) Provided verbal/tactile cueing for activities related to improving balance, coordination, kinesthetic sense, posture, motor skill, proprioception and motor activation to allow for proper function of core, proximal hip and LE with self-care and ADLs and functional mobility.   [] (27641) Gait Re-education- Provided training and instruction to the patient for proper LE, core and proximal hip recruitment and positioning and eccentric body weight control with ambulation re-education including up and down stairs     Home Exercise Program:    [x] (99527) Reviewed/Progressed HEP activities related to strengthening, flexibility, endurance, ROM of core, proximal hip and LE for functional self-care, mobility, lifting and ambulation/stair navigation   [] (75797) prevent re-injury. [] Progressing: [] Met: [] Not Met: [] Adjusted     2. Patient will have a decrease in pain to facilitate improvement in movement, function, and ADLs as indicated by Functional Deficits. [] Progressing: [] Met: [] Not Met: [] Adjusted     Long Term Goals: To be achieved in: 8 weeks  1. Disability index score of 37% or less for the LEFS to assist with reaching prior level of function. [] Progressing: [] Met: [] Not Met: [] Adjusted     2. Patient will demonstrate increased AROM to 0-127 to allow for proper joint functioning as indicated by patients Functional Deficits. [] Progressing: [] Met: [] Not Met: [] Adjusted     3. Patient will demonstrate an increase in Strength to good proximal hip strength and control, within 5lb HHD in LE to allow for proper functional mobility as indicated by patients Functional Deficits. [] Progressing: [] Met: [] Not Met: [] Adjusted     4. Patient will return to functional activities without increased symptoms or restriction. [] Progressing: [] Met: [] Not Met: [] Adjusted     5. Patient will perform ADL's I no AD without increased symptoms or restriction (patient specific functional goal)    [] Progressing: [] Met: [] Not Met: [] Adjusted        Overall Progression Towards Functional goals/ Treatment Progress Update:  [] Patient is progressing as expected towards functional goals listed. [] Progression is slowed due to complexities/Impairments listed. [] Progression has been slowed due to co-morbidities.   [x] Plan just implemented, too soon to assess goals progression <30days   [] Goals require adjustment due to lack of progress  [] Patient is not progressing as expected and requires additional follow up with physician  [] Other    Prognosis for POC: [x] Good [] Fair  [] Poor      Patient requires continued skilled intervention: [x] Yes  [] No    Treatment/Activity Tolerance:  [x] Patient able to complete treatment  [] Patient limited by fatigue  [] Patient limited by pain    [] Patient limited by other medical complications  [] Other:     Return to Play: (if applicable)   []  Stage 1: Intro to Strength   []  Stage 2: Return to Run and Strength   []  Stage 3: Return to Jump and Strength   []  Stage 4: Dynamic Strength and Agility   []  Stage 5: Sport Specific Training     []  Ready to Return to Play, Meets All Above Stages   []  Not Ready for Return to Sports   Comments:                          PLAN: See eval  [x] Continue per plan of care [] Alter current plan (see comments above)  [] Plan of care initiated [] Hold pending MD visit [] Discharge    Electronically signed by:  Dago Rollins PT    Note: If patient does not return for scheduled/ recommended follow up visits, this note will serve as a discharge from care along with most recent update on progress.

## 2021-04-27 ENCOUNTER — HOSPITAL ENCOUNTER (OUTPATIENT)
Dept: PHYSICAL THERAPY | Age: 61
Discharge: HOME | End: 2021-04-27
Payer: COMMERCIAL

## 2021-04-27 PROCEDURE — 97112 NEUROMUSCULAR REEDUCATION: CPT

## 2021-04-27 PROCEDURE — 97110 THERAPEUTIC EXERCISES: CPT

## 2021-04-27 NOTE — PROGRESS NOTES
abd/ext/flex  TKE  Knee extension  HS curl 45#  60#  20#  50# 2x10ea  30  3x10  3x10     SLS  Lateral band walk 10\" 5  3 Foam, vc  Orange, vc           Pt education 8'  HEP with gradual progression, goals of PT, not to push through pain with ex, use of ice- pt stated understanding    Manual Intervention ([address])       Lat to med patella mobs 5'                                         NMR re-education (25949)   CUES NEEDED                                                                   Therapeutic Activity (84395)                                          mTraks access code:  [email or web address]; [email or web address]           Therapeutic Exercise and NMR EXR  [x] (26727) Provided verbal/tactile cueing for activities related to strengthening, flexibility, endurance, ROM for improvements in LE, proximal hip, and core control with self care, mobility, lifting, ambulation. [x] (97373) Provided verbal/tactile cueing for activities related to improving balance, coordination, kinesthetic sense, posture, motor skill, proprioception to assist with LE, proximal hip, and core control in self-care, mobility, lifting, ambulation and eccentric single leg control.      NMR and Therapeutic Activities:    [x] (60056 or 78947) Provided verbal/tactile cueing for activities related to improving balance, coordination, kinesthetic sense, posture, motor skill, proprioception and motor activation to allow for proper function of core, proximal hip and LE with self-care and ADLs and functional mobility.   [] (11966) Gait Re-education- Provided training and instruction to the patient for proper LE, core and proximal hip recruitment and positioning and eccentric body weight control with ambulation re-education including up and down stairs     Home Exercise Program:    [x] (90470) Reviewed/Progressed HEP activities related to strengthening, flexibility, endurance, ROM of core, proximal hip and LE for functional self-care, mobility, lifting and ambulation/stair navigation   [] (67469) Reviewed/Progressed HEP activities related to improving balance, coordination, kinesthetic sense, posture, motor skill, proprioception of core, proximal hip and LE for self-care, mobility, lifting, and ambulation/stair navigation      Manual Treatments:  PROM / STM / Oscillations-Mobs:  G-I, II, III, IV (PA's, Inf., Post.)  [] (15851) Provided manual therapy to mobilize LE, proximal hip and/or LS spine soft tissue/joints for the purpose of modulating pain, promoting relaxation, increasing ROM, reducing/eliminating soft tissue swelling/inflammation/restriction, improving soft tissue extensibility and allowing for proper ROM for normal function with self-care, mobility, lifting and ambulation. Modalities:  CP 10'   [] GAME READY (VASO)- for significant edema, swelling, pain control. Charges:  Timed Code Treatment Minutes: 38   Total Treatment Minutes:  38   BWC:  TE TIME:  NMR TIME:  MANUAL TIME:  UNTIMED MINUTES:  Medicare Total:       [] EVAL (LOW) 76259 (typically 20 minutes face-to-face)  [] EVAL (MOD) 78199 (typically 30 minutes face-to-face)  [] EVAL (HIGH) 80841 (typically 45 minutes face-to-face)  [] RE-EVAL     [x] KS(88524) x  2   [] IONTO  [x] NMR (12071) x     [] VASO  [] Manual (24237) x     [] Other:  [] TA x      [] Mech Traction (94401)  [] ES(attended) (31970)      [] ES (un) (24226):    ASSESSMENT:  See above. GOALS:     Patient stated goal: Less pain with ADL's. Therapist goals for Patient:   Short Term Goals: To be achieved in: 2 weeks  1. Independent in HEP and progression per patient tolerance, in order to prevent re-injury. [x] Progressing: [] Met: [] Not Met: [] Adjusted     2. Patient will have a decrease in pain to facilitate improvement in movement, function, and ADLs as indicated by Functional Deficits. [x] Progressing: [] Met: [] Not Met: [] Adjusted     Long Term Goals: To be achieved in: 8 weeks  1.  Disability index score of 37% or less for the to Play, Meets All Above Stages   []  Not Ready for Return to Sports   Comments:                          PLAN: See eval  [x] Continue per plan of care [] Alter current plan (see comments above)  [] Plan of care initiated [] Hold pending MD visit [] Discharge    Electronically signed by:  Dara Link PT    Note: If patient does not return for scheduled/ recommended follow up visits, this note will serve as a discharge from care along with most recent update on progress.

## 2021-05-04 ENCOUNTER — HOSPITAL ENCOUNTER (OUTPATIENT)
Dept: PHYSICAL THERAPY | Age: 61
Discharge: HOME | End: 2021-05-04
Payer: COMMERCIAL

## 2021-05-04 PROCEDURE — 97112 NEUROMUSCULAR REEDUCATION: CPT

## 2021-05-04 PROCEDURE — 97110 THERAPEUTIC EXERCISES: CPT

## 2021-05-04 NOTE — FLOWSHEET NOTE
vc     KARINA- abd/ext/flex  TKE  Knee extension  HS curl 50#  80#  40#  50#; 60# 2x15ea  30  3x10  2x10; 1x10     SLS  Lateral band walk  Zig zag walk    Wall squat/mini squat and sit to stand 10\" 5  3  3 Foam, vc  Lime, vc  Lime, vc    P! Did not add           Pt education 8'  HEP with gradual progression, goals of PT, not to push through pain with ex, use of ice- pt stated understanding    Manual Intervention ([phone])       Lat to med patella mobs 5'                                         NMR re-education (32942)   CUES NEEDED                                                                   Therapeutic Activity (65230)                                          [employer] access code:  [email or web address]; [email or web address]           Therapeutic Exercise and NMR EXR  [x] (67279) Provided verbal/tactile cueing for activities related to strengthening, flexibility, endurance, ROM for improvements in LE, proximal hip, and core control with self care, mobility, lifting, ambulation. [x] (89514) Provided verbal/tactile cueing for activities related to improving balance, coordination, kinesthetic sense, posture, motor skill, proprioception to assist with LE, proximal hip, and core control in self-care, mobility, lifting, ambulation and eccentric single leg control.      NMR and Therapeutic Activities:    [x] (83210 or 54151) Provided verbal/tactile cueing for activities related to improving balance, coordination, kinesthetic sense, posture, motor skill, proprioception and motor activation to allow for proper function of core, proximal hip and LE with self-care and ADLs and functional mobility.   [] (77578) Gait Re-education- Provided training and instruction to the patient for proper LE, core and proximal hip recruitment and positioning and eccentric body weight control with ambulation re-education including up and down stairs     Home Exercise Program:    [x] (15712) Reviewed/Progressed HEP activities related to strengthening, flexibility, endurance, ROM of core, proximal hip and LE for functional self-care, mobility, lifting and ambulation/stair navigation   [] (02471) Reviewed/Progressed HEP activities related to improving balance, coordination, kinesthetic sense, posture, motor skill, proprioception of core, proximal hip and LE for self-care, mobility, lifting, and ambulation/stair navigation      Manual Treatments:  PROM / STM / Oscillations-Mobs:  G-I, II, III, IV (PA's, Inf., Post.)  [] (02381) Provided manual therapy to mobilize LE, proximal hip and/or LS spine soft tissue/joints for the purpose of modulating pain, promoting relaxation, increasing ROM, reducing/eliminating soft tissue swelling/inflammation/restriction, improving soft tissue extensibility and allowing for proper ROM for normal function with self-care, mobility, lifting and ambulation. Modalities:  CP 10'   [] GAME READY (VASO)- for significant edema, swelling, pain control. Charges:  Timed Code Treatment Minutes: 40   Total Treatment Minutes:  40   BWC:  TE TIME:  NMR TIME:  MANUAL TIME:  UNTIMED MINUTES:  Medicare Total:       [] EVAL (LOW) 15073 (typically 20 minutes face-to-face)  [] EVAL (MOD) 58974 (typically 30 minutes face-to-face)  [] EVAL (HIGH) 20474 (typically 45 minutes face-to-face)  [] RE-EVAL     [x] JQ(19808) x  2   [] IONTO  [x] NMR (07549) x     [] VASO  [] Manual (13562) x     [] Other:  [] TA x      [] Mech Traction (62004)  [] ES(attended) (31499)      [] ES (un) (92729):    ASSESSMENT:  ROM progressing. Pt progressing with decreased pain, increased function. Performed machines without complaints of pain. Pt reports compliance with HEP. Reiterated not to force through pain with ex including knee flex. Ease into it and to spread reps throughout the day to improve tolerance. Pt agrees to continue 1-2 more visits then transition to I with HEP pending progress.  Continues to demo pain with functional mobility including sit to stand and mini squats. GOALS:     Patient stated goal: Less pain with ADL's. Therapist goals for Patient:   Short Term Goals: To be achieved in: 2 weeks  1. Independent in HEP and progression per patient tolerance, in order to prevent re-injury. [x] Progressing: [] Met: [] Not Met: [] Adjusted     2. Patient will have a decrease in pain to facilitate improvement in movement, function, and ADLs as indicated by Functional Deficits. [x] Progressing: [] Met: [] Not Met: [] Adjusted     Long Term Goals: To be achieved in: 8 weeks  1. Disability index score of 37% or less for the LEFS to assist with reaching prior level of function. [x] Progressing: [] Met: [] Not Met: [] Adjusted     2. Patient will demonstrate increased AROM to 0-127 to allow for proper joint functioning as indicated by patients Functional Deficits. [x] Progressing: [] Met: [] Not Met: [] Adjusted     3. Patient will demonstrate an increase in Strength to good proximal hip strength and control, within 5lb HHD in LE to allow for proper functional mobility as indicated by patients Functional Deficits. [x] Progressing: [] Met: [] Not Met: [] Adjusted     4. Patient will return to functional activities without increased symptoms or restriction. [x] Progressing: [] Met: [] Not Met: [] Adjusted     5. Patient will perform ADL's I no AD without increased symptoms or restriction (patient specific functional goal)    [x] Progressing: [] Met: [] Not Met: [] Adjusted        Overall Progression Towards Functional goals/ Treatment Progress Update:  [] Patient is progressing as expected towards functional goals listed. [] Progression is slowed due to complexities/Impairments listed. [] Progression has been slowed due to co-morbidities.   [x] Plan just implemented, too soon to assess goals progression <30days   [] Goals require adjustment due to lack of progress  [] Patient is not progressing as expected and requires additional follow up with physician  [] Other    Prognosis for POC: [x] Good [] Fair  [] Poor      Patient requires continued skilled intervention: [x] Yes  [] No    Treatment/Activity Tolerance:  [x] Patient able to complete treatment  [] Patient limited by fatigue  [] Patient limited by pain    [] Patient limited by other medical complications  [] Other:     Return to Play: (if applicable)   []  Stage 1: Intro to Strength   []  Stage 2: Return to Run and Strength   []  Stage 3: Return to Jump and Strength   []  Stage 4: Dynamic Strength and Agility   []  Stage 5: Sport Specific Training     []  Ready to Return to Play, Meets All Above Stages   []  Not Ready for Return to Sports   Comments:                          PLAN: See eval  [x] Continue per plan of care [] Alter current plan (see comments above)  [] Plan of care initiated [] Hold pending MD visit [] Discharge    Electronically signed by:  Roddy Gonzalez PT    Note: If patient does not return for scheduled/ recommended follow up visits, this note will serve as a discharge from care along with most recent update on progress.

## 2021-05-11 ENCOUNTER — HOSPITAL ENCOUNTER (OUTPATIENT)
Dept: PHYSICAL THERAPY | Age: 61
Discharge: HOME | End: 2021-05-11
Payer: COMMERCIAL

## 2021-05-11 PROCEDURE — 97112 NEUROMUSCULAR REEDUCATION: CPT

## 2021-05-11 PROCEDURE — 97110 THERAPEUTIC EXERCISES: CPT

## 2021-05-11 NOTE — FLOWSHEET NOTE
[facility], [address] [place] [address], [address] [place] [address] [address]  Phone: [phone]   Fax:     [phone]                   Physical Therapy Daily Treatment Note    Date:  2021    Patient Name:  Morelia Bangura    :  1960  MRN: [id number]  Restrictions/Precautions:    Medical/Treatment Diagnosis Information:  · Diagnosis: Right knee pain M25.561  · Treatment Diagnosis: Right knee pain M60.319  Insurance/Certification information:  PT Insurance Information: [employer]  Physician Information:  Referring Practitioner: Dr. Flor Jacques  Has the plan of care been signed (Y/N):        [x]  Yes  []  No     Date of Patient follow up with Physician: 21    Is this a Progress Report:     []  Yes  [x]  No      If Yes:  Date Range for reporting period:  Beginning: 3/31/21 ------------ Endin    Progress report will be due (10 Rx or 30 days whichever is less): 4/10/91      Recertification will be due (POC Duration  / 90 days whichever is less): 21       Visit # Insurance Allowable Auth Required   In Person 5 30 []  Yes     []  No    Tele Health 0  []  Yes     []  No    Total 5       Functional Scale: LEFS   24%   Date assessed:   21     Latex Allergy:  [x]NO      []YES  Preferred Language for Healthcare:   [x]English       []other:    Pain level:  1-3/10     SUBJECTIVE:    Pt reports has been feeling a little better. Stated is resigned that it is going to hurt some. Pt reports pain and function have improved some.     OBJECTIVE: See eval   Observation:    Test measurements:  lacking 2 fr 0 - 127     RESTRICTIONS/PRECAUTIONS: OA, hx R knee scope 2019    Exercises/Interventions:   Therapeutic Ex (06561) Sets/sec Reps Notes/CUES HEP   Bike   Calf stretch belt 6'  30\"   3   vc    HS stretch long sit 30\" 3 vc    Heel slides flex 10\" 10 vc    Quad sets 10\"  vc    SLR 10\" 30 vc       HR's  30 vc    Bridge  Leg press  Single leg 10\"  130#  70# flexibility, endurance, ROM of core, proximal hip and LE for functional self-care, mobility, lifting and ambulation/stair navigation   [] (60546) Reviewed/Progressed HEP activities related to improving balance, coordination, kinesthetic sense, posture, motor skill, proprioception of core, proximal hip and LE for self-care, mobility, lifting, and ambulation/stair navigation      Manual Treatments:  PROM / STM / Oscillations-Mobs:  G-I, II, III, IV (PA's, Inf., Post.)  [] (33390) Provided manual therapy to mobilize LE, proximal hip and/or LS spine soft tissue/joints for the purpose of modulating pain, promoting relaxation, increasing ROM, reducing/eliminating soft tissue swelling/inflammation/restriction, improving soft tissue extensibility and allowing for proper ROM for normal function with self-care, mobility, lifting and ambulation. Modalities:  CP 10'   [] GAME READY (VASO)- for significant edema, swelling, pain control. Charges:  Timed Code Treatment Minutes: 30   Total Treatment Minutes:  30   BWC:  TE TIME:  NMR TIME:  MANUAL TIME:  UNTIMED MINUTES:  Medicare Total:       [] EVAL (LOW) 22330 (typically 20 minutes face-to-face)  [] EVAL (MOD) 56055 (typically 30 minutes face-to-face)  [] EVAL (HIGH) 53005 (typically 45 minutes face-to-face)  [] RE-EVAL     [x] GH(75035) x  1  [] IONTO  [x] NMR (73598) x  1   [] VASO  [] Manual (36497) x     [] Other:  [] TA x      [] Mech Traction (34059)  [] ES(attended) (60250)      [] ES (un) (25009):    ASSESSMENT:  ROM progressing. Pt progressing gradually with decreased pain, increased function. Pt stated does feel that he has improved some, stated does feel that he has hit a plateau in his improvement and stated understands that he will likely have some level of pain, however does not feel that he is now limited with his function. Performed machines without complaints of pain. Pt reports compliance with HEP.  Reiterated not to force through pain with ex including knee flex. Ease into it and to spread reps throughout the day to improve tolerance. Pt agrees transition to I with HEP at this time, will f/u with PT in next 1-2 weeks if issues occur. GOALS:     Patient stated goal: Less pain with ADL's. Therapist goals for Patient:   Short Term Goals: To be achieved in: 2 weeks  1. Independent in HEP and progression per patient tolerance, in order to prevent re-injury. [x] Progressing: [] Met: [] Not Met: [] Adjusted     2. Patient will have a decrease in pain to facilitate improvement in movement, function, and ADLs as indicated by Functional Deficits. [x] Progressing: [] Met: [] Not Met: [] Adjusted     Long Term Goals: To be achieved in: 8 weeks  1. Disability index score of 37% or less for the LEFS to assist with reaching prior level of function. [x] Progressing: [] Met: [] Not Met: [] Adjusted     2. Patient will demonstrate increased AROM to 0-127 to allow for proper joint functioning as indicated by patients Functional Deficits. [x] Progressing: [] Met: [] Not Met: [] Adjusted     3. Patient will demonstrate an increase in Strength to good proximal hip strength and control, within 5lb HHD in LE to allow for proper functional mobility as indicated by patients Functional Deficits. [x] Progressing: [] Met: [] Not Met: [] Adjusted     4. Patient will return to functional activities without increased symptoms or restriction. [x] Progressing: [] Met: [] Not Met: [] Adjusted     5. Patient will perform ADL's I no AD without increased symptoms or restriction (patient specific functional goal)    [x] Progressing: [] Met: [] Not Met: [] Adjusted        Overall Progression Towards Functional goals/ Treatment Progress Update:  [] Patient is progressing as expected towards functional goals listed. [] Progression is slowed due to complexities/Impairments listed. [] Progression has been slowed due to co-morbidities.   [x] Plan just implemented, too soon to assess goals progression <30days   [] Goals require adjustment due to lack of progress  [] Patient is not progressing as expected and requires additional follow up with physician  [] Other    Prognosis for POC: [x] Good [] Fair  [] Poor      Patient requires continued skilled intervention: [x] Yes  [] No    Treatment/Activity Tolerance:  [x] Patient able to complete treatment  [] Patient limited by fatigue  [] Patient limited by pain    [] Patient limited by other medical complications  [] Other:     Return to Play: (if applicable)   []  Stage 1: Intro to Strength   []  Stage 2: Return to Run and Strength   []  Stage 3: Return to Jump and Strength   []  Stage 4: Dynamic Strength and Agility   []  Stage 5: Sport Specific Training     []  Ready to Return to Play, Meets All Above Stages   []  Not Ready for Return to Sports   Comments:                          PLAN: See eval  [x] Continue per plan of care [] Alter current plan (see comments above)  [] Plan of care initiated [] Hold pending MD visit [] Discharge    Electronically signed by:  Estella Tripp PT    Note: If patient does not return for scheduled/ recommended follow up visits, this note will serve as a discharge from care along with most recent update on progress.

## 2021-05-27 ENCOUNTER — OFFICE VISIT (OUTPATIENT)
Dept: ORTHOPEDIC SURGERY | Age: 61
End: 2021-05-27

## 2021-05-27 VITALS — HEIGHT: 74 IN | BODY MASS INDEX: 27.59 KG/M2 | WEIGHT: 215 LBS

## 2021-05-27 DIAGNOSIS — M25.561 RIGHT KNEE PAIN, UNSPECIFIED CHRONICITY: ICD-10-CM

## 2021-05-27 DIAGNOSIS — S83.241A TEAR OF MEDIAL MENISCUS OF RIGHT KNEE, CURRENT, UNSPECIFIED TEAR TYPE, INITIAL ENCOUNTER: ICD-10-CM

## 2021-05-27 DIAGNOSIS — M94.261 CHONDROMALACIA, RIGHT KNEE: ICD-10-CM

## 2021-05-27 DIAGNOSIS — M17.11 PRIMARY OSTEOARTHRITIS OF RIGHT KNEE: Primary | ICD-10-CM

## 2021-05-27 PROCEDURE — 20611 DRAIN/INJ JOINT/BURSA W/US: CPT | Performed by: ORTHOPAEDIC SURGERY

## 2021-05-27 RX ORDER — BUPIVACAINE HYDROCHLORIDE 2.5 MG/ML
10 INJECTION, SOLUTION INFILTRATION; PERINEURAL ONCE
Status: SHIPPED | OUTPATIENT
Start: 2021-05-27

## 2021-05-27 RX ORDER — TRIAMCINOLONE ACETONIDE 40 MG/ML
80 INJECTION, SUSPENSION INTRA-ARTICULAR; INTRAMUSCULAR ONCE
Status: SHIPPED | OUTPATIENT
Start: 2021-05-27

## 2021-05-27 RX ORDER — LIDOCAINE HYDROCHLORIDE 10 MG/ML
40 INJECTION, SOLUTION INFILTRATION; PERINEURAL ONCE
Status: SHIPPED | OUTPATIENT
Start: 2021-05-27

## 2024-03-16 ENCOUNTER — OFFICE VISIT (OUTPATIENT)
Age: 64
End: 2024-03-16

## 2024-03-16 VITALS
OXYGEN SATURATION: 95 % | WEIGHT: 250 LBS | SYSTOLIC BLOOD PRESSURE: 128 MMHG | HEIGHT: 74 IN | TEMPERATURE: 98.2 F | BODY MASS INDEX: 32.08 KG/M2 | HEART RATE: 82 BPM | DIASTOLIC BLOOD PRESSURE: 81 MMHG | RESPIRATION RATE: 18 BRPM

## 2024-03-16 DIAGNOSIS — R11.10 VOMITING, UNSPECIFIED VOMITING TYPE, UNSPECIFIED WHETHER NAUSEA PRESENT: ICD-10-CM

## 2024-03-16 DIAGNOSIS — J02.9 SORE THROAT: Primary | ICD-10-CM

## 2024-03-16 PROBLEM — E53.8 B12 DEFICIENCY: Status: ACTIVE | Noted: 2019-12-03

## 2024-03-16 PROBLEM — E55.9 VITAMIN D DEFICIENCY: Status: ACTIVE | Noted: 2020-12-03

## 2024-03-16 LAB
INFLUENZA A ANTIGEN, POC: NEGATIVE
INFLUENZA B ANTIGEN, POC: NEGATIVE
Lab: NORMAL
QC PASS/FAIL: NORMAL
SARS-COV-2 RDRP RESP QL NAA+PROBE: NEGATIVE
STREPTOCOCCUS A RNA: NEGATIVE

## 2024-03-16 ASSESSMENT — ENCOUNTER SYMPTOMS: VOMITING: 1

## 2024-03-16 NOTE — PROGRESS NOTES
Geovanni Webster (:  1960) is a 64 y.o. male,New patient, here for evaluation of the following chief complaint(s):  Emesis (Thursday night vomiting, Friday morning woke up with sore throat, headache, cold chills, tight muscles in back of neck. )      ASSESSMENT/PLAN:    ICD-10-CM    1. Sore throat  J02.9 POCT Rapid Strep A DNA     CANCELED: POCT COVID-19, Antigen      2. Vomiting, unspecified vomiting type, unspecified whether nausea present  R11.10 POCT Influenza A/B Antigen (BD Veritor)     POCT COVID-19 Rapid, NAAT     CANCELED: POCT COVID-19, Antigen           Take medicaton as prescribed. Reviewed increasing water intake, sleeping in an elevated position to aid post nasal drip, using a cool mist humidifier,covering cough and proper hand hygiene.  Neg for FLU, NEG STREP.  NEG COVID   Follow up with your pcp in 7 days if symptoms persist or if symptoms worsen.  Homeopathic and Flonase treatment      SUBJECTIVE/OBJECTIVE:    History provided by:  Patient   used: No    Emesis       HPI:   64 y.o. male presents with symptoms of vomiting Thursday not  just lack of appetite on Friday, sore throat, head ache, chills, neck soreness ongoing since Thursday. Denies shortness of breath.     Vitals:    24 1132   BP: 128/81   Site: Right Upper Arm   Position: Sitting   Cuff Size: Medium Adult   Pulse: 82   Resp: 18   Temp: 98.2 °F (36.8 °C)   TempSrc: Oral   SpO2: 95%   Weight: 113.4 kg (250 lb)   Height: 1.88 m (6' 2\")       Review of Systems   Gastrointestinal:  Positive for vomiting.       Physical Exam  Vitals and nursing note reviewed.   Constitutional:       Appearance: Normal appearance.   HENT:      Head: Normocephalic.      Right Ear: Hearing, tympanic membrane, ear canal and external ear normal.      Left Ear: Hearing, ear canal and external ear normal. A middle ear effusion is present.      Nose: Congestion present. No rhinorrhea.      Right Sinus: No maxillary sinus tenderness

## 2024-03-16 NOTE — PATIENT INSTRUCTIONS
Take medicaton as prescribed. Reviewed increasing water intake, sleeping in an elevated position to aid post nasal drip, using a cool mist humidifier,covering cough and proper hand hygiene.  Neg for FLU, NEG STREP.  NEG COVID   Follow up with your pcp in 7 days if symptoms persist or if symptoms worsen.  Homeopathic and Flonase treatment

## 2025-03-05 ENCOUNTER — APPOINTMENT (OUTPATIENT)
Dept: CT IMAGING | Age: 65
DRG: 066 | End: 2025-03-05
Payer: COMMERCIAL

## 2025-03-05 ENCOUNTER — HOSPITAL ENCOUNTER (INPATIENT)
Age: 65
LOS: 1 days | Discharge: HOME | DRG: 066 | End: 2025-03-06
Attending: STUDENT IN AN ORGANIZED HEALTH CARE EDUCATION/TRAINING PROGRAM | Admitting: INTERNAL MEDICINE
Payer: COMMERCIAL

## 2025-03-05 ENCOUNTER — APPOINTMENT (OUTPATIENT)
Dept: CT IMAGING | Age: 65
DRG: 066 | End: 2025-03-05
Attending: STUDENT IN AN ORGANIZED HEALTH CARE EDUCATION/TRAINING PROGRAM
Payer: COMMERCIAL

## 2025-03-05 DIAGNOSIS — R29.818 SUSPECTED SLEEP APNEA: ICD-10-CM

## 2025-03-05 DIAGNOSIS — H53.9 VISION CHANGES: Primary | ICD-10-CM

## 2025-03-05 DIAGNOSIS — I67.1 BRAIN ANEURYSM: ICD-10-CM

## 2025-03-05 DIAGNOSIS — I63.9 OCCIPITAL STROKE (HCC): ICD-10-CM

## 2025-03-05 DIAGNOSIS — I61.9 CVA (CEREBROVASCULAR ACCIDENT DUE TO INTRACEREBRAL HEMORRHAGE) (HCC): ICD-10-CM

## 2025-03-05 DIAGNOSIS — R47.01 APHASIA: ICD-10-CM

## 2025-03-05 DIAGNOSIS — R42 DIZZINESS: ICD-10-CM

## 2025-03-05 PROBLEM — G45.9 TIA (TRANSIENT ISCHEMIC ATTACK): Status: ACTIVE | Noted: 2025-03-05

## 2025-03-05 LAB
ALBUMIN SERPL-MCNC: 4.2 G/DL (ref 3.4–5)
ALBUMIN/GLOB SERPL: 1.1 {RATIO} (ref 1.1–2.2)
ALP SERPL-CCNC: 87 U/L (ref 40–129)
ALT SERPL-CCNC: 34 U/L (ref 10–40)
ANION GAP SERPL CALCULATED.3IONS-SCNC: 13 MMOL/L (ref 3–16)
APTT BLD: 27.2 SEC (ref 22.1–36.4)
AST SERPL-CCNC: 30 U/L (ref 15–37)
BASE EXCESS BLDV CALC-SCNC: -2.4 MMOL/L (ref -3–3)
BASOPHILS # BLD: 0.1 K/UL (ref 0–0.2)
BASOPHILS NFR BLD: 1.2 %
BILIRUB SERPL-MCNC: 0.7 MG/DL (ref 0–1)
BUN SERPL-MCNC: 15 MG/DL (ref 7–20)
CALCIUM SERPL-MCNC: 10 MG/DL (ref 8.3–10.6)
CHLORIDE SERPL-SCNC: 104 MMOL/L (ref 99–110)
CO2 BLDV-SCNC: 23 MMOL/L
CO2 SERPL-SCNC: 21 MMOL/L (ref 21–32)
COHGB MFR BLDV: 1.6 % (ref 0–1.5)
CREAT SERPL-MCNC: 1.1 MG/DL (ref 0.8–1.3)
DEPRECATED RDW RBC AUTO: 13.8 % (ref 12.4–15.4)
EKG ATRIAL RATE: 72 BPM
EKG DIAGNOSIS: NORMAL
EKG P AXIS: 62 DEGREES
EKG P-R INTERVAL: 208 MS
EKG Q-T INTERVAL: 388 MS
EKG QRS DURATION: 80 MS
EKG QTC CALCULATION (BAZETT): 424 MS
EKG R AXIS: 48 DEGREES
EKG T AXIS: 68 DEGREES
EKG VENTRICULAR RATE: 72 BPM
EOSINOPHIL # BLD: 0.1 K/UL (ref 0–0.6)
EOSINOPHIL NFR BLD: 1.1 %
GFR SERPLBLD CREATININE-BSD FMLA CKD-EPI: 75 ML/MIN/{1.73_M2}
GLUCOSE BLD-MCNC: 104 MG/DL (ref 70–99)
GLUCOSE BLD-MCNC: 88 MG/DL (ref 70–99)
GLUCOSE SERPL-MCNC: 90 MG/DL (ref 70–99)
HCO3 BLDV-SCNC: 21.8 MMOL/L (ref 23–29)
HCT VFR BLD AUTO: 49.1 % (ref 40.5–52.5)
HGB BLD-MCNC: 17.2 G/DL (ref 13.5–17.5)
INR PPP: 1.03 (ref 0.85–1.15)
LYMPHOCYTES # BLD: 2.9 K/UL (ref 1–5.1)
LYMPHOCYTES NFR BLD: 26.9 %
MCH RBC QN AUTO: 30.7 PG (ref 26–34)
MCHC RBC AUTO-ENTMCNC: 35 G/DL (ref 31–36)
MCV RBC AUTO: 87.8 FL (ref 80–100)
METHGB MFR BLDV: 0.3 %
MONOCYTES # BLD: 0.8 K/UL (ref 0–1.3)
MONOCYTES NFR BLD: 7.2 %
NEUTROPHILS # BLD: 6.9 K/UL (ref 1.7–7.7)
NEUTROPHILS NFR BLD: 63.6 %
NT-PROBNP SERPL-MCNC: <36 PG/ML (ref 0–124)
O2 THERAPY: ABNORMAL
PCO2 BLDV: 36.5 MMHG (ref 40–50)
PERFORMED ON: ABNORMAL
PERFORMED ON: NORMAL
PH BLDV: 7.39 [PH] (ref 7.35–7.45)
PLATELET # BLD AUTO: 222 K/UL (ref 135–450)
PMV BLD AUTO: 8.2 FL (ref 5–10.5)
PO2 BLDV: 74.1 MMHG (ref 25–40)
POTASSIUM SERPL-SCNC: 4.5 MMOL/L (ref 3.5–5.1)
PROT SERPL-MCNC: 8 G/DL (ref 6.4–8.2)
PROTHROMBIN TIME: 13.7 SEC (ref 11.9–14.9)
RBC # BLD AUTO: 5.59 M/UL (ref 4.2–5.9)
SAO2 % BLDV: 95 %
SODIUM SERPL-SCNC: 138 MMOL/L (ref 136–145)
TROPONIN, HIGH SENSITIVITY: 8 NG/L (ref 0–22)
TROPONIN, HIGH SENSITIVITY: 8 NG/L (ref 0–22)
TROPONIN, HIGH SENSITIVITY: 9 NG/L (ref 0–22)
WBC # BLD AUTO: 10.9 K/UL (ref 4–11)

## 2025-03-05 PROCEDURE — 93010 ELECTROCARDIOGRAM REPORT: CPT | Performed by: INTERNAL MEDICINE

## 2025-03-05 PROCEDURE — 6360000002 HC RX W HCPCS: Performed by: INTERNAL MEDICINE

## 2025-03-05 PROCEDURE — G0378 HOSPITAL OBSERVATION PER HR: HCPCS

## 2025-03-05 PROCEDURE — 6370000000 HC RX 637 (ALT 250 FOR IP): Performed by: INTERNAL MEDICINE

## 2025-03-05 PROCEDURE — 85610 PROTHROMBIN TIME: CPT

## 2025-03-05 PROCEDURE — 82803 BLOOD GASES ANY COMBINATION: CPT

## 2025-03-05 PROCEDURE — 99285 EMERGENCY DEPT VISIT HI MDM: CPT

## 2025-03-05 PROCEDURE — 36415 COLL VENOUS BLD VENIPUNCTURE: CPT

## 2025-03-05 PROCEDURE — 70498 CT ANGIOGRAPHY NECK: CPT

## 2025-03-05 PROCEDURE — 2500000003 HC RX 250 WO HCPCS: Performed by: INTERNAL MEDICINE

## 2025-03-05 PROCEDURE — 6370000000 HC RX 637 (ALT 250 FOR IP): Performed by: STUDENT IN AN ORGANIZED HEALTH CARE EDUCATION/TRAINING PROGRAM

## 2025-03-05 PROCEDURE — 85730 THROMBOPLASTIN TIME PARTIAL: CPT

## 2025-03-05 PROCEDURE — 80053 COMPREHEN METABOLIC PANEL: CPT

## 2025-03-05 PROCEDURE — 70450 CT HEAD/BRAIN W/O DYE: CPT

## 2025-03-05 PROCEDURE — 85025 COMPLETE CBC W/AUTO DIFF WBC: CPT

## 2025-03-05 PROCEDURE — 6360000004 HC RX CONTRAST MEDICATION: Performed by: STUDENT IN AN ORGANIZED HEALTH CARE EDUCATION/TRAINING PROGRAM

## 2025-03-05 PROCEDURE — 93005 ELECTROCARDIOGRAM TRACING: CPT | Performed by: STUDENT IN AN ORGANIZED HEALTH CARE EDUCATION/TRAINING PROGRAM

## 2025-03-05 PROCEDURE — 84484 ASSAY OF TROPONIN QUANT: CPT

## 2025-03-05 PROCEDURE — 83880 ASSAY OF NATRIURETIC PEPTIDE: CPT

## 2025-03-05 RX ORDER — POLYETHYLENE GLYCOL 3350 17 G/17G
17 POWDER, FOR SOLUTION ORAL DAILY PRN
Status: DISCONTINUED | OUTPATIENT
Start: 2025-03-05 | End: 2025-03-06 | Stop reason: HOSPADM

## 2025-03-05 RX ORDER — SODIUM CHLORIDE 0.9 % (FLUSH) 0.9 %
5-40 SYRINGE (ML) INJECTION PRN
Status: DISCONTINUED | OUTPATIENT
Start: 2025-03-05 | End: 2025-03-06 | Stop reason: HOSPADM

## 2025-03-05 RX ORDER — ASPIRIN 300 MG/1
300 SUPPOSITORY RECTAL DAILY
Status: DISCONTINUED | OUTPATIENT
Start: 2025-03-06 | End: 2025-03-06 | Stop reason: HOSPADM

## 2025-03-05 RX ORDER — ASPIRIN 81 MG/1
81 TABLET, CHEWABLE ORAL DAILY
Status: DISCONTINUED | OUTPATIENT
Start: 2025-03-06 | End: 2025-03-06 | Stop reason: HOSPADM

## 2025-03-05 RX ORDER — IOPAMIDOL 755 MG/ML
75 INJECTION, SOLUTION INTRAVASCULAR
Status: COMPLETED | OUTPATIENT
Start: 2025-03-05 | End: 2025-03-05

## 2025-03-05 RX ORDER — ONDANSETRON 2 MG/ML
4 INJECTION INTRAMUSCULAR; INTRAVENOUS EVERY 6 HOURS PRN
Status: DISCONTINUED | OUTPATIENT
Start: 2025-03-05 | End: 2025-03-06 | Stop reason: HOSPADM

## 2025-03-05 RX ORDER — SODIUM CHLORIDE 9 MG/ML
INJECTION, SOLUTION INTRAVENOUS PRN
Status: DISCONTINUED | OUTPATIENT
Start: 2025-03-05 | End: 2025-03-06 | Stop reason: HOSPADM

## 2025-03-05 RX ORDER — ONDANSETRON 4 MG/1
4 TABLET, ORALLY DISINTEGRATING ORAL EVERY 8 HOURS PRN
Status: DISCONTINUED | OUTPATIENT
Start: 2025-03-05 | End: 2025-03-06 | Stop reason: HOSPADM

## 2025-03-05 RX ORDER — ASPIRIN 325 MG
325 TABLET ORAL ONCE
Status: COMPLETED | OUTPATIENT
Start: 2025-03-05 | End: 2025-03-05

## 2025-03-05 RX ORDER — ENOXAPARIN SODIUM 100 MG/ML
30 INJECTION SUBCUTANEOUS 2 TIMES DAILY
Status: DISCONTINUED | OUTPATIENT
Start: 2025-03-05 | End: 2025-03-06 | Stop reason: HOSPADM

## 2025-03-05 RX ORDER — ROSUVASTATIN CALCIUM 10 MG/1
40 TABLET, COATED ORAL NIGHTLY
Status: DISCONTINUED | OUTPATIENT
Start: 2025-03-05 | End: 2025-03-06 | Stop reason: HOSPADM

## 2025-03-05 RX ORDER — SODIUM CHLORIDE 0.9 % (FLUSH) 0.9 %
5-40 SYRINGE (ML) INJECTION EVERY 12 HOURS SCHEDULED
Status: DISCONTINUED | OUTPATIENT
Start: 2025-03-05 | End: 2025-03-06 | Stop reason: HOSPADM

## 2025-03-05 RX ADMIN — ENOXAPARIN SODIUM 30 MG: 100 INJECTION SUBCUTANEOUS at 22:19

## 2025-03-05 RX ADMIN — ROSUVASTATIN 40 MG: 10 TABLET, FILM COATED ORAL at 22:20

## 2025-03-05 RX ADMIN — Medication 10 ML: at 22:23

## 2025-03-05 RX ADMIN — IOPAMIDOL 75 ML: 755 INJECTION, SOLUTION INTRAVENOUS at 12:36

## 2025-03-05 RX ADMIN — ASPIRIN 325 MG: 325 TABLET ORAL at 14:04

## 2025-03-05 ASSESSMENT — PAIN - FUNCTIONAL ASSESSMENT: PAIN_FUNCTIONAL_ASSESSMENT: NONE - DENIES PAIN

## 2025-03-05 ASSESSMENT — LIFESTYLE VARIABLES
HOW MANY STANDARD DRINKS CONTAINING ALCOHOL DO YOU HAVE ON A TYPICAL DAY: PATIENT DOES NOT DRINK
HOW OFTEN DO YOU HAVE A DRINK CONTAINING ALCOHOL: NEVER

## 2025-03-05 NOTE — ED PROVIDER NOTES
ganglia calcification is identified. No abnormal extra-axial fluid collection is identified. Bulky benign-appearing calcification is identified along the anterior aspect of the falx cerebri (image 41). There is minimal atherosclerotic calcification of distal internal carotid arteries. ORBITS: The visualized portion of the orbits demonstrate no acute abnormality. PARANASAL SINUSES/MASTOIDS: Minimal mucosal thickening identified within few ethmoid air cells. There is leftward deviation of the nasal septum. Leftward focal deviation with left-sided septal spur is identified (image 5). SOFT TISSUES/SKULL: No acute abnormality of the visualized skull or soft tissues is identified.     No evidence of acute intracranial abnormality.       Labs  Results for orders placed or performed during the hospital encounter of 03/05/25   CBC with Auto Differential   Result Value Ref Range    WBC 10.9 4.0 - 11.0 K/uL    RBC 5.59 4.20 - 5.90 M/uL    Hemoglobin 17.2 13.5 - 17.5 g/dL    Hematocrit 49.1 40.5 - 52.5 %    MCV 87.8 80.0 - 100.0 fL    MCH 30.7 26.0 - 34.0 pg    MCHC 35.0 31.0 - 36.0 g/dL    RDW 13.8 12.4 - 15.4 %    Platelets 222 135 - 450 K/uL    MPV 8.2 5.0 - 10.5 fL    Neutrophils % 63.6 %    Lymphocytes % 26.9 %    Monocytes % 7.2 %    Eosinophils % 1.1 %    Basophils % 1.2 %    Neutrophils Absolute 6.9 1.7 - 7.7 K/uL    Lymphocytes Absolute 2.9 1.0 - 5.1 K/uL    Monocytes Absolute 0.8 0.0 - 1.3 K/uL    Eosinophils Absolute 0.1 0.0 - 0.6 K/uL    Basophils Absolute 0.1 0.0 - 0.2 K/uL   CMP w/ Reflex to MG   Result Value Ref Range    Sodium 138 136 - 145 mmol/L    Potassium reflex Magnesium 4.5 3.5 - 5.1 mmol/L    Chloride 104 99 - 110 mmol/L    CO2 21 21 - 32 mmol/L    Anion Gap 13 3 - 16    Glucose 90 70 - 99 mg/dL    BUN 15 7 - 20 mg/dL    Creatinine 1.1 0.8 - 1.3 mg/dL    Est, Glom Filt Rate 75 >60    Calcium 10.0 8.3 - 10.6 mg/dL    Total Protein 8.0 6.4 - 8.2 g/dL    Albumin 4.2 3.4 - 5.0 g/dL    Albumin/Globulin Ratio

## 2025-03-05 NOTE — CONSULTS
Consult Placed   Via Perfect Serve     Who: Dr. Lobo Valadez - Neurology  Date: 3/5/2025  Time: 1738     Electronically signed by Yoselin Mederos RN on 3/5/2025 at 5:38 PM

## 2025-03-05 NOTE — ED NOTES
Geovanni Webster is a 65 y.o. male admitted for  Principal Problem:    TIA (transient ischemic attack)  Resolved Problems:    * No resolved hospital problems. *  .   Patient Home via self with   Chief Complaint   Patient presents with    Eye Problem     Reports waking up at 0630 with blurry/wavy vision in his left eye. Reports being nauseous this morning and passed out this morning. Feels like he is unable to concentrate.    .  Patient is alert and Oriented x4   Patient's baseline mobility: Baseline Mobility: Independent   Code Status: No Order   Cardiac Rhythm:       Is patient on baseline Oxygen: no:   Abnormal Assessment Findings: patient reports left eye vision changes that started this morning, patient reports syncopal episode and nausea.     Isolation: None      NIH Score:    C-SSRS: Risk of Suicide: No Risk  Bedside swallow:        Active LDA's:   Peripheral IV 03/05/25 Left Antecubital (Active)   Site Assessment Clean, dry & intact 03/05/25 1113   Line Status Blood return noted 03/05/25 1113   Line Care Connections checked and tightened 03/05/25 1113   Phlebitis Assessment No symptoms 03/05/25 1113   Infiltration Assessment 0 03/05/25 1113   Dressing Status New dressing applied 03/05/25 1113   Dressing Type Transparent 03/05/25 1113   Dressing Intervention New 03/05/25 1113       Peripheral IV 03/05/25 Right Antecubital (Active)         Family/Caregiver Present no Any Concerns: no   Restraints no  Sitter no         Vitals:      Vitals:    03/05/25 1316 03/05/25 1330 03/05/25 1345 03/05/25 1357   BP: (!) 141/91 136/72 (!) 152/78    Pulse: 75 73 78 81   Resp: 14 13 16 15   Temp:       TempSrc:       SpO2: 96% 97% 97% 97%   Weight:       Height:           Last documented pain score (0-10 scale)    Pain medication administered No.    Pertinent or High Risk Medications/Drips: No.    Pending Blood Product Administration: no    Abnormal labs:   Abnormal Labs Reviewed   BLOOD GAS, VENOUS - Abnormal; Notable for the

## 2025-03-05 NOTE — ED NOTES
MD Alexander at bedside discussing plan to admit patient. Patient calling wife to update her on plan of care.

## 2025-03-06 ENCOUNTER — TELEPHONE (OUTPATIENT)
Dept: CARDIOLOGY CLINIC | Age: 65
End: 2025-03-06

## 2025-03-06 ENCOUNTER — TELEPHONE (OUTPATIENT)
Dept: CARDIAC CATH/INVASIVE PROCEDURES | Age: 65
End: 2025-03-06

## 2025-03-06 ENCOUNTER — APPOINTMENT (OUTPATIENT)
Age: 65
DRG: 066 | End: 2025-03-06
Attending: INTERNAL MEDICINE
Payer: COMMERCIAL

## 2025-03-06 ENCOUNTER — APPOINTMENT (OUTPATIENT)
Dept: MRI IMAGING | Age: 65
DRG: 066 | End: 2025-03-06
Payer: COMMERCIAL

## 2025-03-06 VITALS
BODY MASS INDEX: 35.42 KG/M2 | HEART RATE: 78 BPM | OXYGEN SATURATION: 95 % | TEMPERATURE: 98.2 F | DIASTOLIC BLOOD PRESSURE: 88 MMHG | HEIGHT: 74 IN | RESPIRATION RATE: 18 BRPM | WEIGHT: 276 LBS | SYSTOLIC BLOOD PRESSURE: 140 MMHG

## 2025-03-06 DIAGNOSIS — I63.9 CEREBROVASCULAR ACCIDENT (CVA), UNSPECIFIED MECHANISM (HCC): Primary | ICD-10-CM

## 2025-03-06 PROBLEM — R03.0 ELEVATED BP WITHOUT DIAGNOSIS OF HYPERTENSION: Status: ACTIVE | Noted: 2025-03-06

## 2025-03-06 PROBLEM — I67.1 BRAIN ANEURYSM: Status: ACTIVE | Noted: 2025-03-06

## 2025-03-06 PROBLEM — R29.818 SUSPECTED SLEEP APNEA: Status: ACTIVE | Noted: 2025-03-06

## 2025-03-06 PROBLEM — I61.9 CVA (CEREBROVASCULAR ACCIDENT DUE TO INTRACEREBRAL HEMORRHAGE) (HCC): Status: ACTIVE | Noted: 2025-03-06

## 2025-03-06 PROBLEM — R55 NEUROCARDIOGENIC SYNCOPE: Status: ACTIVE | Noted: 2025-03-06

## 2025-03-06 PROBLEM — E66.01 SEVERE OBESITY: Status: ACTIVE | Noted: 2025-03-06

## 2025-03-06 LAB
ANION GAP SERPL CALCULATED.3IONS-SCNC: 11 MMOL/L (ref 3–16)
BUN SERPL-MCNC: 15 MG/DL (ref 7–20)
CALCIUM SERPL-MCNC: 9 MG/DL (ref 8.3–10.6)
CHLORIDE SERPL-SCNC: 105 MMOL/L (ref 99–110)
CHOLEST SERPL-MCNC: 178 MG/DL (ref 0–199)
CO2 SERPL-SCNC: 23 MMOL/L (ref 21–32)
CREAT SERPL-MCNC: 1.2 MG/DL (ref 0.8–1.3)
CRP SERPL-MCNC: 7.6 MG/L (ref 0–5.1)
DEPRECATED RDW RBC AUTO: 13.8 % (ref 12.4–15.4)
ECHO AO ASC DIAM: 3.7 CM
ECHO AO ASCENDING AORTA INDEX: 1.49 CM/M2
ECHO AO ROOT DIAM: 3.4 CM
ECHO AO ROOT INDEX: 1.37 CM/M2
ECHO AV AREA PEAK VELOCITY: 3.3 CM2
ECHO AV AREA VTI: 3.3 CM2
ECHO AV AREA/BSA PEAK VELOCITY: 1.3 CM2/M2
ECHO AV AREA/BSA VTI: 1.3 CM2/M2
ECHO AV MEAN GRADIENT: 4 MMHG
ECHO AV MEAN GRADIENT: 4 MMHG
ECHO AV MEAN VELOCITY: 0.9 M/S
ECHO AV PEAK GRADIENT: 8 MMHG
ECHO AV PEAK VELOCITY: 1.4 M/S
ECHO AV VELOCITY RATIO: 0.86
ECHO AV VTI: 24.9 CM
ECHO BSA: 2.56 M2
ECHO EST RA PRESSURE: 8 MMHG
ECHO LA AREA 2C: 13.7 CM2
ECHO LA AREA 4C: 13.5 CM2
ECHO LA DIAMETER INDEX: 1.41 CM/M2
ECHO LA DIAMETER: 3.5 CM
ECHO LA MAJOR AXIS: 5.3 CM
ECHO LA MINOR AXIS: 5.1 CM
ECHO LA TO AORTIC ROOT RATIO: 1.03
ECHO LA VOL BP: 29 ML (ref 18–58)
ECHO LA VOL MOD A2C: 29 ML (ref 18–58)
ECHO LA VOL MOD A4C: 27 ML (ref 18–58)
ECHO LA VOL/BSA BIPLANE: 12 ML/M2 (ref 16–34)
ECHO LA VOLUME INDEX MOD A2C: 12 ML/M2 (ref 16–34)
ECHO LA VOLUME INDEX MOD A4C: 11 ML/M2 (ref 16–34)
ECHO LV E' LATERAL VELOCITY: 11.4 CM/S
ECHO LV E' SEPTAL VELOCITY: 7.94 CM/S
ECHO LV EDV A2C: 39 ML
ECHO LV EDV A4C: 73 ML
ECHO LV EDV INDEX A4C: 29 ML/M2
ECHO LV EDV NDEX A2C: 16 ML/M2
ECHO LV EF PHYSICIAN: 58 %
ECHO LV EJECTION FRACTION A2C: 60 %
ECHO LV EJECTION FRACTION A4C: 58 %
ECHO LV EJECTION FRACTION BIPLANE: 59 % (ref 55–100)
ECHO LV ESV A2C: 16 ML
ECHO LV ESV A4C: 31 ML
ECHO LV ESV INDEX A2C: 6 ML/M2
ECHO LV ESV INDEX A4C: 12 ML/M2
ECHO LV FRACTIONAL SHORTENING: 31 % (ref 28–44)
ECHO LV INTERNAL DIMENSION DIASTOLE INDEX: 1.69 CM/M2
ECHO LV INTERNAL DIMENSION DIASTOLIC: 4.2 CM (ref 4.2–5.9)
ECHO LV INTERNAL DIMENSION SYSTOLIC INDEX: 1.16 CM/M2
ECHO LV INTERNAL DIMENSION SYSTOLIC: 2.9 CM
ECHO LV IVSD: 1 CM (ref 0.6–1)
ECHO LV MASS 2D: 137.2 G (ref 88–224)
ECHO LV MASS INDEX 2D: 55.1 G/M2 (ref 49–115)
ECHO LV POSTERIOR WALL DIASTOLIC: 1 CM (ref 0.6–1)
ECHO LV RELATIVE WALL THICKNESS RATIO: 0.48
ECHO LVOT AREA: 3.8 CM2
ECHO LVOT AV VTI INDEX: 0.86
ECHO LVOT DIAM: 2.2 CM
ECHO LVOT MEAN GRADIENT: 3 MMHG
ECHO LVOT PEAK GRADIENT: 6 MMHG
ECHO LVOT PEAK VELOCITY: 1.2 M/S
ECHO LVOT STROKE VOLUME INDEX: 32.5 ML/M2
ECHO LVOT SV: 80.9 ML
ECHO LVOT VTI: 21.3 CM
ECHO RA AREA 4C: 12.1 CM2
ECHO RA END SYSTOLIC VOLUME APICAL 4 CHAMBER INDEX BSA: 10 ML/M2
ECHO RA VOLUME: 26 ML
ECHO RIGHT VENTRICULAR SYSTOLIC PRESSURE (RVSP): 40 MMHG
ECHO RV BASAL DIMENSION: 2.5 CM
ECHO RV FREE WALL PEAK S': 9.1 CM/S
ECHO RV LONGITUDINAL DIMENSION: 7.5 CM
ECHO RV MID DIMENSION: 1.6 CM
ECHO RV TAPSE: 1.5 CM (ref 1.7–?)
ECHO TV REGURGITANT MAX VELOCITY: 2.84 M/S
ECHO TV REGURGITANT PEAK GRADIENT: 32 MMHG
EKG ATRIAL RATE: 78 BPM
EKG DIAGNOSIS: NORMAL
EKG P AXIS: 8 DEGREES
EKG P-R INTERVAL: 196 MS
EKG Q-T INTERVAL: 366 MS
EKG QRS DURATION: 76 MS
EKG QTC CALCULATION (BAZETT): 417 MS
EKG R AXIS: 17 DEGREES
EKG T AXIS: 60 DEGREES
EKG VENTRICULAR RATE: 78 BPM
ERYTHROCYTE [SEDIMENTATION RATE] IN BLOOD BY WESTERGREN METHOD: 25 MM/HR (ref 0–20)
EST. AVERAGE GLUCOSE BLD GHB EST-MCNC: 111.2 MG/DL
GFR SERPLBLD CREATININE-BSD FMLA CKD-EPI: 68 ML/MIN/{1.73_M2}
GLUCOSE SERPL-MCNC: 100 MG/DL (ref 70–99)
HBA1C MFR BLD: 5.5 %
HCT VFR BLD AUTO: 42.9 % (ref 40.5–52.5)
HDLC SERPL-MCNC: 38 MG/DL (ref 40–60)
HGB BLD-MCNC: 14.6 G/DL (ref 13.5–17.5)
LDLC SERPL CALC-MCNC: 122 MG/DL
MCH RBC QN AUTO: 30.2 PG (ref 26–34)
MCHC RBC AUTO-ENTMCNC: 34 G/DL (ref 31–36)
MCV RBC AUTO: 88.8 FL (ref 80–100)
PLATELET # BLD AUTO: 203 K/UL (ref 135–450)
PMV BLD AUTO: 8.2 FL (ref 5–10.5)
POTASSIUM SERPL-SCNC: 4.5 MMOL/L (ref 3.5–5.1)
RBC # BLD AUTO: 4.83 M/UL (ref 4.2–5.9)
SODIUM SERPL-SCNC: 139 MMOL/L (ref 136–145)
TRIGL SERPL-MCNC: 89 MG/DL (ref 0–150)
VLDLC SERPL CALC-MCNC: 18 MG/DL
WBC # BLD AUTO: 10.8 K/UL (ref 4–11)

## 2025-03-06 PROCEDURE — 97535 SELF CARE MNGMENT TRAINING: CPT

## 2025-03-06 PROCEDURE — 99221 1ST HOSP IP/OBS SF/LOW 40: CPT | Performed by: NEUROMUSCULOSKELETAL MEDICINE & OMM

## 2025-03-06 PROCEDURE — 93306 TTE W/DOPPLER COMPLETE: CPT

## 2025-03-06 PROCEDURE — 36415 COLL VENOUS BLD VENIPUNCTURE: CPT

## 2025-03-06 PROCEDURE — 86140 C-REACTIVE PROTEIN: CPT

## 2025-03-06 PROCEDURE — 97165 OT EVAL LOW COMPLEX 30 MIN: CPT

## 2025-03-06 PROCEDURE — 93005 ELECTROCARDIOGRAM TRACING: CPT | Performed by: NEUROMUSCULOSKELETAL MEDICINE & OMM

## 2025-03-06 PROCEDURE — 6370000000 HC RX 637 (ALT 250 FOR IP): Performed by: INTERNAL MEDICINE

## 2025-03-06 PROCEDURE — 85027 COMPLETE CBC AUTOMATED: CPT

## 2025-03-06 PROCEDURE — 80061 LIPID PANEL: CPT

## 2025-03-06 PROCEDURE — 80048 BASIC METABOLIC PNL TOTAL CA: CPT

## 2025-03-06 PROCEDURE — 70551 MRI BRAIN STEM W/O DYE: CPT

## 2025-03-06 PROCEDURE — 6370000000 HC RX 637 (ALT 250 FOR IP)

## 2025-03-06 PROCEDURE — 6360000002 HC RX W HCPCS: Performed by: INTERNAL MEDICINE

## 2025-03-06 PROCEDURE — 2500000003 HC RX 250 WO HCPCS: Performed by: INTERNAL MEDICINE

## 2025-03-06 PROCEDURE — 96125 COGNITIVE TEST BY HC PRO: CPT

## 2025-03-06 PROCEDURE — APPSS45 APP SPLIT SHARED TIME 31-45 MINUTES

## 2025-03-06 PROCEDURE — G0378 HOSPITAL OBSERVATION PER HR: HCPCS

## 2025-03-06 PROCEDURE — 97161 PT EVAL LOW COMPLEX 20 MIN: CPT

## 2025-03-06 PROCEDURE — 85652 RBC SED RATE AUTOMATED: CPT

## 2025-03-06 PROCEDURE — 83036 HEMOGLOBIN GLYCOSYLATED A1C: CPT

## 2025-03-06 PROCEDURE — 99223 1ST HOSP IP/OBS HIGH 75: CPT | Performed by: INTERNAL MEDICINE

## 2025-03-06 RX ORDER — CLOPIDOGREL BISULFATE 75 MG/1
75 TABLET ORAL DAILY
Status: DISCONTINUED | OUTPATIENT
Start: 2025-03-06 | End: 2025-03-06 | Stop reason: HOSPADM

## 2025-03-06 RX ORDER — ROSUVASTATIN CALCIUM 40 MG/1
40 TABLET, COATED ORAL NIGHTLY
Qty: 30 TABLET | Refills: 3 | Status: SHIPPED | OUTPATIENT
Start: 2025-03-06

## 2025-03-06 RX ORDER — ASPIRIN 81 MG/1
81 TABLET, CHEWABLE ORAL DAILY
Qty: 30 TABLET | Refills: 3 | Status: SHIPPED | OUTPATIENT
Start: 2025-03-07

## 2025-03-06 RX ORDER — ACETAMINOPHEN 325 MG/1
650 TABLET ORAL EVERY 4 HOURS PRN
Status: DISCONTINUED | OUTPATIENT
Start: 2025-03-06 | End: 2025-03-06 | Stop reason: HOSPADM

## 2025-03-06 RX ORDER — CLOPIDOGREL BISULFATE 75 MG/1
75 TABLET ORAL DAILY
Qty: 20 TABLET | Refills: 0 | Status: SHIPPED | OUTPATIENT
Start: 2025-03-07

## 2025-03-06 RX ADMIN — Medication 10 ML: at 10:05

## 2025-03-06 RX ADMIN — CLOPIDOGREL BISULFATE 75 MG: 75 TABLET ORAL at 10:50

## 2025-03-06 RX ADMIN — ENOXAPARIN SODIUM 30 MG: 100 INJECTION SUBCUTANEOUS at 10:06

## 2025-03-06 RX ADMIN — ACETAMINOPHEN 650 MG: 325 TABLET ORAL at 12:02

## 2025-03-06 RX ADMIN — ASPIRIN 81 MG: 81 TABLET, CHEWABLE ORAL at 10:04

## 2025-03-06 ASSESSMENT — ENCOUNTER SYMPTOMS
STRIDOR: 0
BLURRED VISION: 1
SNORING: 1
HEMATOCHEZIA: 0
HEMATEMESIS: 0
SHORTNESS OF BREATH: 0
WHEEZING: 0

## 2025-03-06 ASSESSMENT — PAIN SCALES - GENERAL
PAINLEVEL_OUTOF10: 3
PAINLEVEL_OUTOF10: 3

## 2025-03-06 ASSESSMENT — PAIN DESCRIPTION - PAIN TYPE
TYPE: ACUTE PAIN
TYPE: ACUTE PAIN

## 2025-03-06 ASSESSMENT — PAIN DESCRIPTION - LOCATION
LOCATION: HEAD
LOCATION: HEAD

## 2025-03-06 NOTE — TELEPHONE ENCOUNTER
Monitor company [employer]  Length of monitor 30 days  Monitor ordered by Boone Brennan MD, electrophysiologist   Patch ID [id number]   Device number [id number]  Monitor given to Agnes Montez RN   Nurse to apply at the time of discharge.

## 2025-03-06 NOTE — CARE COORDINATION
Case Management Assessment  Initial Evaluation    Date/Time of Evaluation: 3/6/2025 1:54 PM  Assessment Completed by: Norma Hernandez RN    If patient is discharged prior to next notation, then this note serves as note for discharge by case management.    Patient Name: Geovanni Webster                   YOB: 1960  Diagnosis: Aphasia [R47.01]  TIA (transient ischemic attack) [G45.9]  Brain aneurysm [I67.1]  Vision changes [H53.9]  CVA (cerebrovascular accident due to intracerebral hemorrhage) (HCC) [I61.9]                   Date / Time: 3/5/2025 11:01 AM    Patient Admission Status: Inpatient   Readmission Risk (Low < 19, Mod (19-27), High > 27): No data recorded  Current PCP: Jose Cruz Arita, DO  PCP verified by CM? Yes (Juan J)    Chart Reviewed: Yes      History Provided by: Patient, Medical Record, Spouse  Patient Orientation: Alert and Oriented    Patient Cognition: Alert    Hospitalization in the last 30 days (Readmission):  No    If yes, Readmission Assessment in CM Navigator will be completed.    Advance Directives:      Code Status: Full Code   Patient's Primary Decision Maker is: Legal Next of Kin      Discharge Planning:    Patient lives with: Spouse/Significant Other Type of Home: House  Primary Care Giver: Self  Patient Support Systems include: Spouse/Significant Other   Current Financial resources: None  Current community resources: None  Current services prior to admission: None            Current DME:              Type of Home Care services:  None    ADLS  Prior functional level: Independent in ADLs/IADLs  Current functional level: Independent in ADLs/IADLs    PT AM-PAC: 24 /24  OT AM-PAC: 24 /24    Family can provide assistance at DC: Yes  Would you like Case Management to discuss the discharge plan with any other family members/significant others, and if so, who? No  Plans to Return to Present Housing: Yes  Other Identified Issues/Barriers to RETURNING to current housing:

## 2025-03-06 NOTE — DISCHARGE SUMMARY
left-sided septal spur is identified (image 5). SOFT TISSUES/SKULL: No acute abnormality of the visualized skull or soft tissues is identified.     No evidence of acute intracranial abnormality.      Consults:     IP CONSULT TO HOSPITALIST  IP CONSULT TO NEUROLOGY  IP CONSULT TO CASE MANAGEMENT  IP CONSULT TO CARDIOLOGY    Labs:     Recent Labs     03/05/25  1114 03/06/25  0504   WBC 10.9 10.8   HGB 17.2 14.6   HCT 49.1 42.9    203     Recent Labs     03/05/25  1114 03/06/25  0504    139   K 4.5 4.5    105   CO2 21 23   BUN 15 15   CREATININE 1.1 1.2   CALCIUM 10.0 9.0     Recent Labs     03/05/25  1114 03/05/25  1610 03/05/25  2227   PROBNP <36  --   --    TROPHS 9 8 8     No results for input(s): \"LABA1C\" in the last 72 hours.  Recent Labs     03/05/25  1114   AST 30   ALT 34   BILITOT 0.7   ALKPHOS 87     Recent Labs     03/05/25  1114   INR 1.03       Urine Cultures: No results found for: \"LABURIN\"  Blood Cultures: No results found for: \"BC\"  No results found for: \"BLOODCULT2\"  Organism: No results found for: \"ORG\"    Signed:    Cristóbal Bailey DO

## 2025-03-06 NOTE — TELEPHONE ENCOUNTER
Geovanni Webster [birth year], Bed- 544, Rn- Agnes P- [phone], Reason- SYNCOPAL episode in house

## 2025-03-06 NOTE — TELEPHONE ENCOUNTER
Sent to [facility] Phone:    Geovanni Webster 1960. Bed 544. 30 day SYED upon discharge. Agnes [phone]

## 2025-03-06 NOTE — DISCHARGE INSTRUCTIONS
have signs of another stroke. These may include:  Sudden numbness, tingling, weakness, or loss of movement in your face, arm, or leg, especially on only one side of your body.  Sudden vision changes.  Sudden trouble speaking.  Sudden confusion or trouble understanding simple statements.  Sudden problems with walking or balance.  A sudden, severe headache that is different from past headaches.  Fainting.  A seizure.  Call 911 even if these symptoms go away in a few minutes.   Call your doctor now or seek immediate medical care if:    You have new symptoms that may be related to your stroke, such as falls or trouble swallowing.   Watch and call if:    You have been feeling sad, depressed, or hopeless, or you have lost interest in things that you usually enjoy.     You have anxiety or fear that affects your life.   Watch closely for changes in your health, and be sure to contact your doctor if you have any problems.  Where can you learn more?  Go to https://www.[employer].net/patientEd and enter C294 to learn more about \"Stroke: Care Instructions.\"  Current as of: July 31, 2024  Content Version: 14.3  © 2024 [employer].   Care instructions adapted under license by [employer]. If you have questions about a medical condition or this instruction, always ask your healthcare professional. [employer], [employer] disclaims any warranty or liability for your use of this information.

## 2025-03-06 NOTE — CONSULTS
Inpatient Neurology consult        [facility] Neurology      Geovanni Wbester  1960    Date of Service: 3/6/2025    Referring Physician: Jimenez Shabazz MD      Reason for the consult and CC: TIA/visual changes     HPI:   The patient is a 65 y.o. male with a past medical history of hyperlipidemia, GERD, and migraines (25 years ago) originally presenting to the hospital for concerns of changes in vision in the left eye. Patient reports waking up around 6:30 AM and walking to the restroom when he developed rapid onset dizziness described as \"his body shutting down\" and left eye visual changes with photosensitivity and reports \"white waves\" in left visual field. Patient reports he sat down and loss consciousness for an unknown amount of time and awoke to his head leaning on the wall. He then walked down the stairs and felt extremely nauseous and vomited once. He denies any headache, sound sensitivity, unilateral weakness, palpitations, or slurred speech but does endorse bilateral trapezius soreness. Patient reports symptoms lasted approximately 12 hours. Patient reports history of migraines approximately 25 years ago but states this is not his typical migraine pattern. Neurology has been consulted for further evaluation and management of possible TIA.        Constitutional:   Vitals:    03/05/25 2159 03/06/25 0209 03/06/25 0836 03/06/25 1105   BP: 112/87 115/77  139/78   Pulse: 82 73 81 77   Resp:  18 16    Temp:  98.6 °F (37 °C) 97.7 °F (36.5 °C)    TempSrc:  Oral Oral    SpO2:  95% 95% 95%   Weight:       Height:             I personally reviewed and updated social history, past medical history, medications, allergy, surgical history, and family history as documented in the patient's electronic health records.       ROS: 10-14 ROS reviewed with the patient/nurse/family which were unremarkable except mentioned in H&P.    General appearance:  Normal development and appear in no acute distress.   Mental

## 2025-03-06 NOTE — CONSULTS
Cardiac Electrophysiology Consult Note      Physician requesting consult: Jimenez Shabazz MD   Reason for Consult: syncope  Admission Date: 3/5/2025  Room/Bed:  0544/0544-01    Assessment:     1. Syncope: symptoms, context (and findings on telemetry) all consistent with a vaso-vagal type event. He saw blood (from an IV taken out) last night and started feeling dizzy then briefly lost consciousness. This coincided on telemetry with sinus slowing and second degree type 1 AV block with minor pauses and at one point junctional rhythm. He has had similar episodes before. Otherwise without bradycardia.     He also reported an episode of apparent syncope at home yesterday morning (while sitting on toilet) which coincided with his vision changes. All he knows is that he regained consciousness and his head was leaning against the wall. No further details available.     Continue to monitor on telemetry. Can follow up with below planned event monitor.     2. Stroke: presented with vision changes (now mostly resolved) with finding on MRI of \"Small patchy acute infarcts in the right occipital lobe\". No clear arrhythmic etiology but he is at risk for such. Obtain trans-thoracic echocardiogram to assess overall cardiac structure and function. Continue to monitor on telemetry and arrange 30-day event monitor on discharge.     He is now on DAPT and statin therapy.    3. Intermittent elevation of blood pressure without formal diagnosis of hypertension: recommend continued monitoring and consideration of anti-hypertensive therapy.     4. Highly suspected obstructive sleep apnea: strongly recommended outpatient sleep medicine evaluation.     Plan:     1. Echocardiogram  2. Maintain on telemetry  3. 30-day event monitor on discharge with outpatient cariology follow up  4. Strongly recommended outpatient sleep medicine evaluation    Subjective:     History of Present Illness:    Patient is a 65 y.o. male with past medical history

## 2025-03-06 NOTE — PROGRESS NOTES
[facility] Medicine Progress Note  V 1.6      Date of Admission: 3/5/2025    Hospital Day: 2      Chief Admission Complaint:  Vision change and aphasia     Subjective: Patient reports that he is feeling much better today and feels that his symptoms have completely resolved.  Patient denies any symptoms of visual disturbance or aphasia.  Patient does report that he has a little bit of a headache and back pain, but reports that this may be due to the hospital bed.  Patient denies any symptoms of chest pain, shortness of breath, nausea, vomiting, diarrhea, and abdominal pain.    Presenting Admission History:       65 y.o. male who presented to [facility] with acute onset of syncope.  Patient states when he woke up he did not have a postictal state.  He states he did notice some changes in his vision.  Patient had some waviness on the left portion of his vision.  Patient states his vision also appears darker on that side.  Patient does have migraines but he states this is not typical for his migraine.  No headache noted.  Patient denies chest pain.  He states he did have some trouble with word finding.  Symptoms have improved.  Patient still with some persistent visual change with decreased acuity left visual fields.  Patient denies previous history of TIA.  He had been on rosuvastatin in the past but stopped 1 year ago.     Assessment/Plan:      Current Principal Problem:  TIA (transient ischemic attack)    Acute CVA with visual changes  - Rule out TIA.  Will restart statin.  Recheck lipid profile.   - Patient placed on aspirin 81 mg daily.   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  - Neurology consulted, 
Pt and family given d/c instructions and verbalizes understanding. Escorted to vehicle via wheelchair.  
TODAY'S DATE:  3/6/2025      Current NIHSS 0      Discussed personal risk factors for Stroke/TIA with patient/family, and ways to reduce the risk for a recurrent stroke.     Patient's personal risk factors which were identified are:   []   Alcohol Abuse: check with your physician before any alcohol consumption.   []   Atrial fibrillation: may cause blood clots  []   Drug Abuse: Seek help, talk with your doctor  []   Clotting Disorder  []   Diabetes  []   Family history of stroke or heart disease  []   High Blood Pressure/Hypertension: work with your physician  [x]   High cholesterol: monitor cholesterol levels with your physician  [x]   Overweight/Obesity: work with your physician for your ideal body weight  []   Physical Inactivity: get regular exercise as directed by your physician  []   Personal history of previous TIA or stroke  []   Poor Diet: decrease salt (sodium) in your diet, follow diet directed by physician  []   Smoking: stop smoking      Reviewed the Following Education with Patient and/or Family:   - Signs and Symptoms of Stroke  - Personal risk factors   - How to activate EMS (911)   - Importance of Follow Up Appointments at Discharge   - Importance of Compliance with Medications Prescribed at Discharge  - Available community resources and stroke advocacy groups if needed    Patient and/or family member: verbalized understanding.     Stroke Education booklet given to patient/family (or verified, if given already), which reviews above information. yes         Electronically signed by Agnes Montez RN on 3/6/2025 at 9:52 AM       
exercise program, Safety education & training, Equipment evaluation, education, & procurement, Therapeutic activities, Stair training  Safety Devices  Type of Devices: Nurse notified, Gait belt (pt up in room INDEP)  Restraints  Restraints Initially in Place: No    Restrictions  Restrictions/Precautions  Restrictions/Precautions: General Precautions  Activity Level: Up as Tolerated  Position Activity Restriction  Other Position/Activity Restrictions: Tele     Subjective  General  Chart Reviewed: Yes  Patient assessed for rehabilitation services?: Yes  Family/Caregiver Present: Yes  Referring Practitioner: Jimenez Shabazz MD  Referral Date : 03/05/25  Diagnosis: TIA  Follows Commands: Within Functional Limits  General  General Comments: pt in bed no alarms, RN cleared  Subjective  Subjective: \"I need to have surgery on this knee. It bothers me sometimes\"         Social/Functional History  Social/Functional History  Lives With: Spouse  Type of Home: House  Home Layout: Multi-level (+ 9 + 6)  Home Access: Stairs to enter with rails  Entrance Stairs - Number of Steps: garage R rail 4 +1, front entrance no rail 6  Bathroom Shower/Tub: Walk-in shower  Bathroom Toilet: Standard  Home Equipment: Cane  Has the patient had two or more falls in the past year or any fall with injury in the past year?: No  Prior Level of Assist for ADLs: Independent  Prior Level of Assist for Homemaking: Independent  Homemaking Responsibilities: Yes  Prior Level of Assist for Ambulation: Independent household ambulator, with or without device, Independent community ambulator, with or without device  Prior Level of Assist for Transfers: Independent  Active : Yes  Vision/Hearing  Vision  Vision: Within Functional Limits (peripheral vision WFL)  Tracking: Able to track stimulus in all quads w/o difficulty  Hearing  Hearing: Within functional limits    Cognition   Orientation  Overall Orientation Status: Within Functional 
/ Oral motor exam:   CN V (trigeminal): ophthalmic, maxillary, and mandibular facial sensation- WNL  CN VII (facial): WNL  CN IX/X (glossopharyngeal/vagus): MPT: WFL; pitch range: WFL; vocal quality: WFL; cough: Strong-perceptually  CN XII (hypoglossal): WFL    Oral motor exam   Labial ROM WFL, Labial symmetry WFL, Labial strength WFL, and Lingual coordination WFL    Dentition: full, intact, and natural    Motor Speech: WFL        Comprehension:   Auditory Comprehension: WFL      Reading Comprehension: WFL      Expression:   Primary Mode of Expression: Verbal  Verbal Expression: WFL    Written Expression: WFL; Dominant Hand: Right       Pragmatics/Social Functioning: WFL             Plan  Prognosis:  Speech Therapy Prognosis  Prognosis: Good  Prognosis Considerations: Motivation  Individuals consulted and agree with results and recommendations: Patient  and RN  Safety Devices in place: Yes  Type of Devices: All fall risk precautions in place , Call light within reach, and Bed alarm in place    Recommendations:   Requires SLP Intervention: no  Duration / Frequency of Treatment: n/a    Therapeutic Interventions:  N/a    Education:  Patient education: Role of SLP, Rationale of eval, and Results of eval  Patient Education Responses: pt verbalized understanding and demonstrated understanding         Total Treatment Time / Charges       Time in Time out Total Time / units   Speech / Lang / Cog Eval:  0750  0805  15 min / 1 unit     Signature:    Mónica Salazar MA CCC-SLP SP#[id number]  Speech-Language Pathologist    
Independent  Grooming Skilled Clinical Factors: stand at sink to wash hands  LE Dressing: Independent  LE Dressing Skilled Clinical Factors: doff pants  Toileting: Independent  Toileting Skilled Clinical Factors: urinate at toilet         Cognition  Overall Cognitive Status: WFL  Orientation  Overall Orientation Status: Within Functional Limits  Orientation Level: Oriented to place;Oriented to time;Oriented to situation;Oriented to person      Education Given To: Patient  Education Provided: Role of Therapy;Transfer Training;Plan of Care;Equipment;Precautions;Orientation;Mobility Training  Education Provided Comments: Pt educated on importance of OOB mobility, prevention of complications of bedrest, and general safety during hospitalization  Education Method: Demonstration;Verbal  Barriers to Learning: None  Education Outcome: Verbalized understanding;Demonstrated understanding     AM-PAC - ADL  AM-PAC Daily Activity - Inpatient   How much help is needed for putting on and taking off regular lower body clothing?: None  How much help is needed for bathing (which includes washing, rinsing, drying)?: None  How much help is needed for toileting (which includes using toilet, bedpan, or urinal)?: None  How much help is needed for putting on and taking off regular upper body clothing?: None  How much help is needed for taking care of personal grooming?: None  How much help for eating meals?: None  AM-PAC Inpatient Daily Activity Raw Score: 24  AM-PAC Inpatient ADL T-Scale Score : 57.54  ADL Inpatient CMS 0-100% Score: 0  ADL Inpatient CMS G-Code Modifier : CH    Goals  Short Term Goals  Time Frame for Short Term Goals: 3/10, unless otherwise noted  Short Term Goal 1: Pt will complete functional transfer/toilet transfer with SPV - GOAL MET 3/06  Short Term Goal 2: Pt will complete toileting with IND - GOAL MET 3/06  Short Term Goal 3: Pt will complete LB dressing with IND - GOAL MET 3/06  Short Term Goal 4: Pt will

## 2025-03-07 ENCOUNTER — TELEPHONE (OUTPATIENT)
Dept: CARDIOLOGY CLINIC | Age: 65
End: 2025-03-07

## 2025-03-07 ENCOUNTER — ANCILLARY PROCEDURE (OUTPATIENT)
Dept: CARDIOLOGY CLINIC | Age: 65
End: 2025-03-07

## 2025-03-07 DIAGNOSIS — I63.9 CEREBROVASCULAR ACCIDENT (CVA), UNSPECIFIED MECHANISM (HCC): ICD-10-CM

## 2025-03-07 NOTE — TELEPHONE ENCOUNTER
3/7 Called [phone]. LVM for pt to call and schedule appt for HSFU and discuss 30day monitor results

## 2025-03-07 NOTE — TELEPHONE ENCOUNTER
Pt was admitted to [facility] from 3/5-3/6/25 and saw kwasi. Pt was discharged with 30 day event monitor. Pt called to schedule hsfu. When should pt follow up by? Please advise.

## 2025-03-07 NOTE — TELEPHONE ENCOUNTER
Pt called back and the pt is needing to followup with general cardiology PER KXA. I clarified this with RNEP. Next opening with general cardiology for a hsfu is now May. The pt has not seen any general MD. PMKW are we able to work the pt in for a hsfu? I informed him that our office will call him back with a date time.

## 2025-03-07 NOTE — TELEPHONE ENCOUNTER
Per review of KXA consult note 3/6  Plan:   1. Echocardiogram  2. Maintain on telemetry  3. 30-day event monitor on discharge with outpatient cardiology follow up  4. Strongly recommended outpatient sleep medicine evaluation    Can schedule patient with cardiology for after 30 day monitor results are back

## 2025-03-17 ENCOUNTER — OFFICE VISIT (OUTPATIENT)
Dept: PULMONOLOGY | Age: 65
End: 2025-03-17
Payer: COMMERCIAL

## 2025-03-17 VITALS
HEIGHT: 74 IN | BODY MASS INDEX: 34.78 KG/M2 | SYSTOLIC BLOOD PRESSURE: 118 MMHG | WEIGHT: 271 LBS | HEART RATE: 64 BPM | TEMPERATURE: 97 F | RESPIRATION RATE: 16 BRPM | OXYGEN SATURATION: 94 % | DIASTOLIC BLOOD PRESSURE: 78 MMHG

## 2025-03-17 DIAGNOSIS — G47.30 SLEEP APNEA, UNSPECIFIED TYPE: Primary | ICD-10-CM

## 2025-03-17 DIAGNOSIS — Z86.73 HISTORY OF CVA IN ADULTHOOD: ICD-10-CM

## 2025-03-17 PROCEDURE — G8427 DOCREV CUR MEDS BY ELIG CLIN: HCPCS | Performed by: INTERNAL MEDICINE

## 2025-03-17 PROCEDURE — 3017F COLORECTAL CA SCREEN DOC REV: CPT | Performed by: INTERNAL MEDICINE

## 2025-03-17 PROCEDURE — G8417 CALC BMI ABV UP PARAM F/U: HCPCS | Performed by: INTERNAL MEDICINE

## 2025-03-17 PROCEDURE — 1036F TOBACCO NON-USER: CPT | Performed by: INTERNAL MEDICINE

## 2025-03-17 PROCEDURE — 1111F DSCHRG MED/CURRENT MED MERGE: CPT | Performed by: INTERNAL MEDICINE

## 2025-03-17 PROCEDURE — 99204 OFFICE O/P NEW MOD 45 MIN: CPT | Performed by: INTERNAL MEDICINE

## 2025-03-17 ASSESSMENT — SLEEP AND FATIGUE QUESTIONNAIRES
HOW LIKELY ARE YOU TO NOD OFF OR FALL ASLEEP WHILE LYING DOWN TO REST IN THE AFTERNOON WHEN CIRCUMSTANCES PERMIT: WOULD NEVER DOZE
HOW LIKELY ARE YOU TO NOD OFF OR FALL ASLEEP WHILE SITTING QUIETLY AFTER LUNCH WITHOUT ALCOHOL: WOULD NEVER DOZE
HOW LIKELY ARE YOU TO NOD OFF OR FALL ASLEEP IN A CAR, WHILE STOPPED FOR A FEW MINUTES IN TRAFFIC: WOULD NEVER DOZE
ESS TOTAL SCORE: 1
HOW LIKELY ARE YOU TO NOD OFF OR FALL ASLEEP WHILE WATCHING TV: SLIGHT CHANCE OF DOZING
HOW LIKELY ARE YOU TO NOD OFF OR FALL ASLEEP WHILE SITTING INACTIVE IN A PUBLIC PLACE: WOULD NEVER DOZE
HOW LIKELY ARE YOU TO NOD OFF OR FALL ASLEEP WHILE SITTING AND READING: WOULD NEVER DOZE
HOW LIKELY ARE YOU TO NOD OFF OR FALL ASLEEP WHILE SITTING AND TALKING TO SOMEONE: WOULD NEVER DOZE
HOW LIKELY ARE YOU TO NOD OFF OR FALL ASLEEP WHEN YOU ARE A PASSENGER IN A CAR FOR AN HOUR WITHOUT A BREAK: WOULD NEVER DOZE

## 2025-03-17 NOTE — PROGRESS NOTES
SLEEP MEDICINE CONSULT NOTE  CC & Referring provider: Patient is being seen at the request of Dr. Sotelo for a consultation to evaluate for IRMA.     Presenting HPI 3/17/25:   History of Present Illness  The patient presents for evaluation of suspected obstructive sleep apnea.    The patient experienced a cerebrovascular accident (CVA) two weeks ago, which was preceded by episodes of dizziness and syncope upon awakening. Following the syncopal episode, he reported visual disturbances characterized by wavy lines and difficulty with speech comprehension. He was transported to the emergency department, where a comprehensive diagnostic workup, including magnetic resonance imaging (MRI), computed tomography (CT) scans, and electrocardiograms (ECG), was performed. The findings were suggestive of a stroke and a possible aneurysm; however, all vascular studies returned normal results. His wife reported episodes of snoring, raising concerns about potential atrial fibrillation or obstructive sleep apnea. Despite multiple consultations, no definitive etiology for the stroke has been identified. The patient reports no residual neurological deficits and perceives his condition to have returned to baseline. He has experienced an intentional weight loss of approximately 6 pounds since the stroke.    The patient does not report episodes of apnea during sleep. His sleep schedule is from 9:30 PM to 6:00 AM, with frequent nocturnal awakenings. His Fitbit device has recorded an increase in the frequency of awakenings since the stroke. He is currently being monitored with an event monitor.    FAMILY HISTORY  - No family history of sleep apnea in immediate family        3/17/2025    11:38 AM   Sleep Medicine   Sitting and reading 0   Watching TV 1   Sitting, inactive in a public place (e.g. a theatre or a meeting) 0   As a passenger in a car for an hour without a break 0   Lying down to rest in the afternoon when circumstances permit

## 2025-03-17 NOTE — PROGRESS NOTES
MA Communication:  The following orders are received by verbal communication from Ayden Gomez MD        Orders include:          HST follow up after

## 2025-03-17 NOTE — PATIENT INSTRUCTIONS
What's next:  The Sleep Center at [facility] - [address], [address], [place] [address]  The Sleep center at [facility] - [address], [address], [place] [address]  Phone for both is: [phone] Fax: [phone]    The sleep lab will call you within the next 1 week to schedule a study (if the sleep lab does not reach you within 1 week, please call them at [phone])    Meet with our sleep NP or PA after your sleep study to discuss results, options and recommendations.  If you do not have an appointment already scheduled and have not heard from my office within one week of your sleep study, please call my office to schedule an appointment.          Please refrain from or reduce the use of caffeine and/or alcohol prior to your sleep study and avoid napping the day of your study, as these will affect the accuracy of your test results.  If you are ill the day of your test (COVID-19, cold, upper respiratory infection, flu, etc.) please call to reschedule your test as the test will not be accurate, and for other patients' safety. Avoid napping the day prior to your sleep study as this may make it harder to fall asleep.     If you have any questions or need to cancel/reschedule your appointment, please call the sleep lab @ [phone]     For at home testing: when you come to  the rental unit, please bring:  I.D. and Insurance card  ** Please note the Home Sleep Test Units are limited. It is a 1 night rental and must be dropped off the following day to ensure you are not billed a late or replacement fee. **    For Patients being evaluated for a sleep disorder, including sleep apnea:  Never drive a car or operate a motorized vehicle while drowsy or sleepy.  Maintaining an optimal weight can help limit the severity of sleep apnea.  Treating sleep apnea effectively may help reduce the risk of heart disease, stroke, car accidents, type II diabetes & all cause mortality    Important

## 2025-03-23 ENCOUNTER — HOSPITAL ENCOUNTER (OUTPATIENT)
Dept: SLEEP CENTER | Age: 65
Discharge: HOME | End: 2025-03-25
Payer: COMMERCIAL

## 2025-03-23 DIAGNOSIS — G47.30 SLEEP APNEA, UNSPECIFIED TYPE: ICD-10-CM

## 2025-03-23 DIAGNOSIS — Z86.73 HISTORY OF CVA IN ADULTHOOD: ICD-10-CM

## 2025-03-23 PROCEDURE — 95806 SLEEP STUDY UNATT&RESP EFFT: CPT

## 2025-03-29 PROBLEM — G47.30 SLEEP APNEA: Status: ACTIVE | Noted: 2025-03-29

## 2025-03-31 ENCOUNTER — RESULTS FOLLOW-UP (OUTPATIENT)
Dept: PULMONOLOGY | Age: 65
End: 2025-03-31

## 2025-04-09 ENCOUNTER — TELEPHONE (OUTPATIENT)
Dept: PULMONOLOGY | Age: 65
End: 2025-04-09

## 2025-04-09 ENCOUNTER — OFFICE VISIT (OUTPATIENT)
Dept: PULMONOLOGY | Age: 65
End: 2025-04-09
Payer: COMMERCIAL

## 2025-04-09 VITALS
HEART RATE: 70 BPM | BODY MASS INDEX: 34.52 KG/M2 | RESPIRATION RATE: 20 BRPM | DIASTOLIC BLOOD PRESSURE: 78 MMHG | OXYGEN SATURATION: 95 % | SYSTOLIC BLOOD PRESSURE: 125 MMHG | HEIGHT: 74 IN | WEIGHT: 269 LBS

## 2025-04-09 DIAGNOSIS — Z86.73 HISTORY OF CVA IN ADULTHOOD: ICD-10-CM

## 2025-04-09 DIAGNOSIS — E66.09 CLASS 1 OBESITY DUE TO EXCESS CALORIES WITH SERIOUS COMORBIDITY AND BODY MASS INDEX (BMI) OF 34.0 TO 34.9 IN ADULT: ICD-10-CM

## 2025-04-09 DIAGNOSIS — E66.811 CLASS 1 OBESITY DUE TO EXCESS CALORIES WITH SERIOUS COMORBIDITY AND BODY MASS INDEX (BMI) OF 34.0 TO 34.9 IN ADULT: ICD-10-CM

## 2025-04-09 DIAGNOSIS — G47.33 MODERATE OBSTRUCTIVE SLEEP APNEA: Primary | ICD-10-CM

## 2025-04-09 PROCEDURE — 1036F TOBACCO NON-USER: CPT

## 2025-04-09 PROCEDURE — 99214 OFFICE O/P EST MOD 30 MIN: CPT

## 2025-04-09 PROCEDURE — G8427 DOCREV CUR MEDS BY ELIG CLIN: HCPCS

## 2025-04-09 PROCEDURE — 3017F COLORECTAL CA SCREEN DOC REV: CPT

## 2025-04-09 PROCEDURE — G8417 CALC BMI ABV UP PARAM F/U: HCPCS

## 2025-04-09 ASSESSMENT — SLEEP AND FATIGUE QUESTIONNAIRES
ESS TOTAL SCORE: 1
HOW LIKELY ARE YOU TO NOD OFF OR FALL ASLEEP WHILE SITTING QUIETLY AFTER LUNCH WITHOUT ALCOHOL: WOULD NEVER DOZE
HOW LIKELY ARE YOU TO NOD OFF OR FALL ASLEEP WHILE SITTING AND TALKING TO SOMEONE: WOULD NEVER DOZE
HOW LIKELY ARE YOU TO NOD OFF OR FALL ASLEEP WHILE SITTING AND READING: WOULD NEVER DOZE
HOW LIKELY ARE YOU TO NOD OFF OR FALL ASLEEP WHILE WATCHING TV: SLIGHT CHANCE OF DOZING
HOW LIKELY ARE YOU TO NOD OFF OR FALL ASLEEP IN A CAR, WHILE STOPPED FOR A FEW MINUTES IN TRAFFIC: WOULD NEVER DOZE
HOW LIKELY ARE YOU TO NOD OFF OR FALL ASLEEP WHEN YOU ARE A PASSENGER IN A CAR FOR AN HOUR WITHOUT A BREAK: WOULD NEVER DOZE
HOW LIKELY ARE YOU TO NOD OFF OR FALL ASLEEP WHILE LYING DOWN TO REST IN THE AFTERNOON WHEN CIRCUMSTANCES PERMIT: WOULD NEVER DOZE
HOW LIKELY ARE YOU TO NOD OFF OR FALL ASLEEP WHILE SITTING INACTIVE IN A PUBLIC PLACE: WOULD NEVER DOZE

## 2025-04-09 NOTE — PROGRESS NOTES
SLEEP MEDICINE PROGRESS NOTE  CC & Referring provider: Dr. Sotelo to evaluate for IRMA.     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  ESS is: 1.  Pt is surprised at the diagnosis.  HST equipment was not comfortable, particular difficulty tolerating the nasal sensor, but overall, managed to sleep adequately.  Pt is not receptive to CPAP treatment & reports he would not be able to tolerate it, citing frequent movements during sleep as a potential issue in addition to the difficulty during HST.  Also not interested in Inspire HNS.   Also not interested in oral appliance therapy.  Has been advised by several of his providers to lose weight & so this is the route he would favor to go.  Interested in GLP-1 agonist.  No personal or family history of thyroid cancer. No regular consumption of alcohol.  No dx of T2DM.      Presenting HPI 3/17/25: Presents for evaluation of suspected obstructive sleep apnea. The patient experienced a cerebrovascular accident (CVA) two weeks ago, which was preceded by episodes of dizziness and syncope upon awakening. Following the syncopal episode, he reported visual disturbances characterized by wavy lines and difficulty with speech comprehension. He was transported to the emergency department, where a comprehensive diagnostic workup, including magnetic resonance imaging (MRI), computed tomography (CT) scans, and electrocardiograms (ECG), was performed. The findings were suggestive of a stroke and a possible aneurysm; however, all vascular studies returned normal results. His wife reported episodes of snoring, raising concerns about potential atrial fibrillation or obstructive sleep apnea. Despite multiple consultations, no definitive etiology for the stroke has been identified. The patient reports no residual neurological deficits and perceives his condition to have returned to baseline. He has experienced an

## 2025-04-09 NOTE — TELEPHONE ENCOUNTER
Zepbound has been denied.  Denial states that it is not a covered service, but an appeal is available.  Please advise.     Denial scanned for review.

## 2025-04-09 NOTE — PATIENT INSTRUCTIONS
Tirzepatide (sold under the brand names Zepbound & Mounjaro)    WARNING: RISK OF THYROID C-CELL TUMORS.  Tirzepatide causes dose-dependent and treatment-duration-dependent thyroid C-cell tumors in animal studies. It is unknown whether Tirzepatide causes thyroid C-cell tumors in humans.  If you have a family history of medullary thyroid carcinoma (MTC] or Multiple Endocrine Neoplasia syndrome type 2 (MEN 2), you must not take tirzepatide.  If you develop symptoms of thyroid tumors (e.g., a mass in the neck, dysphagia, dyspnea, persistent hoarseness), immediately notify your doctor.      Most common adverse reactions: The most common adverse reactions reported in >=5% of patients treated with Tirzepatide are nausea, diarrhea, vomiting, constipation, abdominal pain, dyspepsia, injection site reactions, fatigue, hypersensitivity reactions, belching, hair loss, and gastroesophageal reflux disease.  Severe Gastrointestinal Adverse Reactions: Use of Tirzepatide has been associated with rare but severe gastrointestinal adverse reactions including gallbladder disease & pancreatitis, including fatal and non-fatal hemorrhagic or necrotizing pancreatitis.  You should not take Tirzepatide if you have gastroparesis.   Acute Kidney Injury: Use of Tirzepatide has been associated with kidney injury, which can result from dehydration due to nausea, vomiting, and diarrhea. If you have gastrointestinal side effects, notify your doctor so that  your kidney function can be monitored.   Hypersensitivity Reactions: There have been reports of serious hypersensitivity reactions (e.g., anaphylaxis, angioedema) in patients treated with Tirzepatide.    Low blood sugar: Tirzepatide lowers blood sugar.  In patients with diabetes mellitus, monitor blood glucose prior to starting Tirzepatide and during Tirzepatide treatment. You may need to reduce the dose of other diabetes medications.  Notify your doctor if you experience symptoms related to low

## 2025-04-11 NOTE — TELEPHONE ENCOUNTER
Appeal letter, OV and HST faxed to [facility] appeals via [employer] at [phone].

## 2025-04-17 ENCOUNTER — OFFICE VISIT (OUTPATIENT)
Dept: CARDIOLOGY CLINIC | Age: 65
End: 2025-04-17
Payer: COMMERCIAL

## 2025-04-17 ENCOUNTER — RESULTS FOLLOW-UP (OUTPATIENT)
Dept: CARDIOLOGY CLINIC | Age: 65
End: 2025-04-17

## 2025-04-17 ENCOUNTER — TELEPHONE (OUTPATIENT)
Dept: CARDIOLOGY CLINIC | Age: 65
End: 2025-04-17

## 2025-04-17 VITALS
HEART RATE: 64 BPM | SYSTOLIC BLOOD PRESSURE: 120 MMHG | HEIGHT: 74 IN | BODY MASS INDEX: 34.33 KG/M2 | DIASTOLIC BLOOD PRESSURE: 70 MMHG | OXYGEN SATURATION: 97 % | WEIGHT: 267.5 LBS

## 2025-04-17 DIAGNOSIS — E78.2 MIXED HYPERLIPIDEMIA: ICD-10-CM

## 2025-04-17 DIAGNOSIS — G47.30 SLEEP APNEA, UNSPECIFIED TYPE: ICD-10-CM

## 2025-04-17 DIAGNOSIS — I63.9 OCCIPITAL STROKE (HCC): ICD-10-CM

## 2025-04-17 DIAGNOSIS — I61.9 CVA (CEREBROVASCULAR ACCIDENT DUE TO INTRACEREBRAL HEMORRHAGE) (HCC): Primary | ICD-10-CM

## 2025-04-17 DIAGNOSIS — R94.31 ABNORMAL EKG: ICD-10-CM

## 2025-04-17 DIAGNOSIS — R06.02 SHORTNESS OF BREATH: Primary | ICD-10-CM

## 2025-04-17 DIAGNOSIS — R06.09 DOE (DYSPNEA ON EXERTION): ICD-10-CM

## 2025-04-17 DIAGNOSIS — I63.9 CEREBROVASCULAR ACCIDENT (CVA), UNSPECIFIED MECHANISM (HCC): ICD-10-CM

## 2025-04-17 DIAGNOSIS — I61.9 CVA (CEREBROVASCULAR ACCIDENT DUE TO INTRACEREBRAL HEMORRHAGE) (HCC): ICD-10-CM

## 2025-04-17 PROCEDURE — 99204 OFFICE O/P NEW MOD 45 MIN: CPT | Performed by: INTERNAL MEDICINE

## 2025-04-17 PROCEDURE — 3017F COLORECTAL CA SCREEN DOC REV: CPT | Performed by: INTERNAL MEDICINE

## 2025-04-17 PROCEDURE — G8427 DOCREV CUR MEDS BY ELIG CLIN: HCPCS | Performed by: INTERNAL MEDICINE

## 2025-04-17 PROCEDURE — G8417 CALC BMI ABV UP PARAM F/U: HCPCS | Performed by: INTERNAL MEDICINE

## 2025-04-17 PROCEDURE — 1036F TOBACCO NON-USER: CPT | Performed by: INTERNAL MEDICINE

## 2025-04-17 PROCEDURE — G2211 COMPLEX E/M VISIT ADD ON: HCPCS | Performed by: INTERNAL MEDICINE

## 2025-04-17 NOTE — PATIENT INSTRUCTIONS
Plan:  Start Repatha injection every 2 weeks for additional cholesterol control.   Order transesophageal echocardiogram (ACACIA) to further assess your heart strength, anatomy, and function.   ACACIA instructions:  NPO (nothing to eat or drink) after midnight the night prior to procedure. A small sip of water with your medications is okay.  Hold vitamins/supplements the morning of procedure.  You do not have to hold anticoagulation (Eliquis, Coumadin, Plavix, ASA etc)  Have a  with you.    Order Stress test to risk stratify    GXT or Lexiscan Myoview Stress Test instructions:   -Allow 3-4 hours for the entire test to be complete.  -Nothing to eat or drink after midnight prior to testing. May take prescribed medications in morning with sip of water.  -Do not drink or eat anything containing caffeine 24 hours prior to test. This includes coffee, decaffeinated coffee, chocolate, soda, or tea.  Order CT calcium score, which is a noninvasive imaging test that measures the amount of calcium in your coronary arteries.    This test is sometimes not covered by insurance. If this test is not covered by insurance or is too expensive let our office know. We can send an order to [facility] in [place], and the test cost $ to get completed   Recommend Mediterranean diet.   Follow up with me in 6-8 weeks.    Your provider has ordered testing for further evaluation.  An order/prescription has been included in your paper work.   To schedule outpatient testing, contact Central Scheduling by calling [address] [place], [place] [id number] ([phone]).

## 2025-04-17 NOTE — PROGRESS NOTES
sinus rhythm @78 BPM.     Echo 3/6/25    Left Ventricle: Normal left ventricular systolic function with a visually estimated EF of 55 - 60%. Left ventricle size is normal. Normal wall thickness. No regional wall motion abnormalities identified. Decreased sensitivity due to poor endocardial definition.    Right Ventricle: Right ventricle size is normal. Reduced systolic function.    Aortic Valve: No regurgitation. No stenosis.    Mitral Valve: No regurgitation.    Tricuspid Valve: Mild regurgitation. Mildly elevated RVSP, consistent with mild pulmonary hypertension. The estimated RVSP is 40 mmHg.    Pericardium: No pericardial effusion.    IVC/SVC: IVC was not well visualized.    Stress Test:    Cardiac catheterization:    Additional studies:   Cardiac event monitor 3/7/25  Patient was in sinus rhythm during this monitoring period with a average heart rate of 67 bpm and heart rate range of 35 - 150 bpm. Patient had rare PACs and PVCs. There were rare atrial runs for up to 4 beats.   There were episodes of bradycardia and Wenckebach during normal sleeping hours.    MRI Brain 3/6/25  IMPRESSION:  Small patchy acute infarcts in the right occipital lobe.  No intracranial hemorrhage.  Mild atrophy and chronic small vessel ischemic change. Small remote lacunar infarcts in the left basal ganglia    CTA Head and Neck 3/5/25  IMPRESSION:  1.  No large vessel occlusion or flow-limiting stenosis.   .  2.  3 mm MCA trifurcation aneurysm on the right.   Impression and Plan:      CVA in R occipital lobe  HLD  Vaso-vagal syncope  Abnormal event monitor--Wenckebach   HLP  Class 1 Obesity    Plan:  Start Repatha injection every 2 weeks for additional cholesterol control.   Order transesophageal echocardiogram (ACACIA) to further assess your heart strength, anatomy, and function.   ACACIA instructions:  NPO (nothing to eat or drink) after midnight the night prior to procedure. A small sip of water with your medications is okay.  Hold

## 2025-04-17 NOTE — TELEPHONE ENCOUNTER
Cardiac Cath orders: ACACIA  Ordering Provider: BASILIO  Performing Provider: BASILIO  Length of time for procedure:  IV Sedation: (Y/N)  Reps needed:  Specific equip needed:NA  Medications to hold: vitamins/supplements the morning of procedure  Pt aware of meds to hold?: Yes at OV 4/17/25  Urgent? Next available  (All echos should be completed prior to the scheduled procedure date, preferably [place] or other outreach)

## 2025-04-23 ENCOUNTER — TELEPHONE (OUTPATIENT)
Dept: CARDIOLOGY CLINIC | Age: 65
End: 2025-04-23

## 2025-04-23 NOTE — TELEPHONE ENCOUNTER
[facility] called office returning Tash's call, Amelia left call back number: [phone]     This is the only information I was provided. Thank you!

## 2025-04-30 NOTE — TELEPHONE ENCOUNTER
Procedure: ACACIA  Doctor: Yosvany  Date: 5-15-25   Time: 6:30 am  Arrival: 8:00 am  Reps: n/a  Anesthesia: YES     Spoke with patient. Reviewed all preps including medications with full understanding. Advised  patient to arrive at the main entrance of [facility] ([address] [place] [id number]) and check in with the registration desk. They will be directed to the Cath Lab. Remind patient to be NPO after midnight (8 hours prior). Do not apply lotions/creams on skin the day of procedure.

## 2025-05-09 ENCOUNTER — HOSPITAL ENCOUNTER (OUTPATIENT)
Dept: CT IMAGING | Age: 65
Discharge: HOME | End: 2025-05-09
Attending: INTERNAL MEDICINE
Payer: COMMERCIAL

## 2025-05-09 ENCOUNTER — HOSPITAL ENCOUNTER (OUTPATIENT)
Age: 65
Discharge: HOME | End: 2025-05-11
Attending: INTERNAL MEDICINE
Payer: COMMERCIAL

## 2025-05-09 ENCOUNTER — RESULTS FOLLOW-UP (OUTPATIENT)
Dept: CARDIOLOGY CLINIC | Age: 65
End: 2025-05-09

## 2025-05-09 ENCOUNTER — HOSPITAL ENCOUNTER (OUTPATIENT)
Dept: NUCLEAR MEDICINE | Age: 65
Discharge: HOME | End: 2025-05-09
Attending: INTERNAL MEDICINE
Payer: COMMERCIAL

## 2025-05-09 DIAGNOSIS — I63.9 CEREBROVASCULAR ACCIDENT (CVA), UNSPECIFIED MECHANISM (HCC): ICD-10-CM

## 2025-05-09 DIAGNOSIS — I61.9 CVA (CEREBROVASCULAR ACCIDENT DUE TO INTRACEREBRAL HEMORRHAGE) (HCC): ICD-10-CM

## 2025-05-09 DIAGNOSIS — R06.09 DOE (DYSPNEA ON EXERTION): ICD-10-CM

## 2025-05-09 DIAGNOSIS — E78.2 MIXED HYPERLIPIDEMIA: ICD-10-CM

## 2025-05-09 DIAGNOSIS — I63.9 OCCIPITAL STROKE (HCC): ICD-10-CM

## 2025-05-09 DIAGNOSIS — G47.30 SLEEP APNEA, UNSPECIFIED TYPE: ICD-10-CM

## 2025-05-09 DIAGNOSIS — R06.02 SHORTNESS OF BREATH: ICD-10-CM

## 2025-05-09 LAB
NUC STRESS EJECTION FRACTION: 69 %
NUC STRESS LV EDV: 91 ML (ref 67–155)
NUC STRESS LV ESV: 28 ML (ref 22–58)
NUC STRESS LV MASS: 134 G
STRESS BASELINE DIAS BP: 84 MMHG
STRESS BASELINE HR: 64 BPM
STRESS BASELINE SYS BP: 126 MMHG
STRESS ESTIMATED WORKLOAD: 6.3 METS
STRESS EXERCISE DUR MIN: 5 MIN
STRESS EXERCISE DUR SEC: 2 SEC
STRESS PEAK DIAS BP: 85 MMHG
STRESS PEAK SYS BP: 198 MMHG
STRESS PERCENT HR ACHIEVED: 87 %
STRESS POST PEAK HR: 137 BPM
STRESS RATE PRESSURE PRODUCT: NORMAL BPM*MMHG
STRESS TARGET HR: 157 BPM

## 2025-05-09 PROCEDURE — 93017 CV STRESS TEST TRACING ONLY: CPT

## 2025-05-09 PROCEDURE — 75571 CT HRT W/O DYE W/CA TEST: CPT

## 2025-05-09 PROCEDURE — 3430000000 HC RX DIAGNOSTIC RADIOPHARMACEUTICAL: Performed by: INTERNAL MEDICINE

## 2025-05-09 PROCEDURE — A9502 TC99M TETROFOSMIN: HCPCS | Performed by: INTERNAL MEDICINE

## 2025-05-09 PROCEDURE — 78452 HT MUSCLE IMAGE SPECT MULT: CPT

## 2025-05-09 RX ADMIN — TETROFOSMIN 11.2 MILLICURIE: 1.38 INJECTION, POWDER, LYOPHILIZED, FOR SOLUTION INTRAVENOUS at 07:53

## 2025-05-09 RX ADMIN — TETROFOSMIN 32.7 MILLICURIE: 1.38 INJECTION, POWDER, LYOPHILIZED, FOR SOLUTION INTRAVENOUS at 10:39

## 2025-05-12 NOTE — TELEPHONE ENCOUNTER
Please notify patient that his insurance upheld their denial after our appeal to cover Zepbound.  It appears the medication is not eligible based on his certificate of coverage, according to the appeal denial letter.

## 2025-05-13 NOTE — RESULT ENCOUNTER NOTE
Pt returned call. Relayed results, and offered to schedule OV for 5/19, pt stated he was driving and did not have his schedule available and he would like to talk to BASILIO 5/15/25 when he comes for ACACIA. BASILIO AREVALO.

## 2025-05-13 NOTE — TELEPHONE ENCOUNTER
Spoke with pt and informed with verbal understanding.  Pt states that he was aware as he got a letter from insurance yesterday.  Pt will call to make follow up if/when he decides to pursue PAP therapy.

## 2025-05-14 ENCOUNTER — ANESTHESIA EVENT (OUTPATIENT)
Dept: CARDIAC CATH/INVASIVE PROCEDURES | Age: 65
End: 2025-05-14
Payer: COMMERCIAL

## 2025-05-15 ENCOUNTER — HOSPITAL ENCOUNTER (OUTPATIENT)
Dept: CARDIAC CATH/INVASIVE PROCEDURES | Age: 65
Discharge: HOME | End: 2025-05-15
Attending: INTERNAL MEDICINE | Admitting: INTERNAL MEDICINE
Payer: COMMERCIAL

## 2025-05-15 ENCOUNTER — ANESTHESIA (OUTPATIENT)
Dept: CARDIAC CATH/INVASIVE PROCEDURES | Age: 65
End: 2025-05-15
Payer: COMMERCIAL

## 2025-05-15 ENCOUNTER — TELEPHONE (OUTPATIENT)
Dept: CARDIOLOGY CLINIC | Age: 65
End: 2025-05-15

## 2025-05-15 VITALS
OXYGEN SATURATION: 96 % | SYSTOLIC BLOOD PRESSURE: 121 MMHG | WEIGHT: 270 LBS | RESPIRATION RATE: 19 BRPM | HEART RATE: 62 BPM | DIASTOLIC BLOOD PRESSURE: 69 MMHG | HEIGHT: 74 IN | BODY MASS INDEX: 34.65 KG/M2

## 2025-05-15 DIAGNOSIS — I63.9 CEREBROVASCULAR ACCIDENT (CVA), UNSPECIFIED MECHANISM (HCC): ICD-10-CM

## 2025-05-15 DIAGNOSIS — Q21.12 PFO (PATENT FORAMEN OVALE): Primary | ICD-10-CM

## 2025-05-15 LAB
ANION GAP SERPL CALCULATED.3IONS-SCNC: 10 MMOL/L (ref 3–16)
BUN SERPL-MCNC: 12 MG/DL (ref 7–20)
CALCIUM SERPL-MCNC: 9.8 MG/DL (ref 8.3–10.6)
CHLORIDE SERPL-SCNC: 107 MMOL/L (ref 99–110)
CO2 SERPL-SCNC: 22 MMOL/L (ref 21–32)
CREAT SERPL-MCNC: 0.9 MG/DL (ref 0.8–1.3)
DEPRECATED RDW RBC AUTO: 13.8 % (ref 12.4–15.4)
ECHO BSA: 2.53 M2
EKG ATRIAL RATE: 58 BPM
EKG DIAGNOSIS: NORMAL
EKG P AXIS: 54 DEGREES
EKG P-R INTERVAL: 248 MS
EKG Q-T INTERVAL: 420 MS
EKG QRS DURATION: 80 MS
EKG QTC CALCULATION (BAZETT): 412 MS
EKG R AXIS: 39 DEGREES
EKG T AXIS: 62 DEGREES
EKG VENTRICULAR RATE: 58 BPM
GFR SERPLBLD CREATININE-BSD FMLA CKD-EPI: >90 ML/MIN/{1.73_M2}
GLUCOSE SERPL-MCNC: 95 MG/DL (ref 70–99)
HCT VFR BLD AUTO: 42.9 % (ref 40.5–52.5)
HGB BLD-MCNC: 14.7 G/DL (ref 13.5–17.5)
MCH RBC QN AUTO: 30.4 PG (ref 26–34)
MCHC RBC AUTO-ENTMCNC: 34.4 G/DL (ref 31–36)
MCV RBC AUTO: 88.4 FL (ref 80–100)
PLATELET # BLD AUTO: 213 K/UL (ref 135–450)
PMV BLD AUTO: 7.9 FL (ref 5–10.5)
POTASSIUM SERPL-SCNC: 4.3 MMOL/L (ref 3.5–5.1)
RBC # BLD AUTO: 4.85 M/UL (ref 4.2–5.9)
SODIUM SERPL-SCNC: 139 MMOL/L (ref 136–145)
WBC # BLD AUTO: 9.9 K/UL (ref 4–11)

## 2025-05-15 PROCEDURE — 3700000001 HC ADD 15 MINUTES (ANESTHESIA): Performed by: INTERNAL MEDICINE

## 2025-05-15 PROCEDURE — 3700000000 HC ANESTHESIA ATTENDED CARE: Performed by: INTERNAL MEDICINE

## 2025-05-15 PROCEDURE — 6360000002 HC RX W HCPCS

## 2025-05-15 PROCEDURE — 99234 HOSP IP/OBS SM DT SF/LOW 45: CPT | Performed by: INTERNAL MEDICINE

## 2025-05-15 PROCEDURE — 2580000003 HC RX 258: Performed by: ANESTHESIOLOGY

## 2025-05-15 PROCEDURE — 93319 3D ECHO IMG CGEN CAR ANOMAL: CPT | Performed by: INTERNAL MEDICINE

## 2025-05-15 PROCEDURE — 7100000010 HC PHASE II RECOVERY - FIRST 15 MIN: Performed by: INTERNAL MEDICINE

## 2025-05-15 PROCEDURE — 93005 ELECTROCARDIOGRAM TRACING: CPT | Performed by: INTERNAL MEDICINE

## 2025-05-15 PROCEDURE — 93320 DOPPLER ECHO COMPLETE: CPT | Performed by: INTERNAL MEDICINE

## 2025-05-15 PROCEDURE — 93312 ECHO TRANSESOPHAGEAL: CPT | Performed by: INTERNAL MEDICINE

## 2025-05-15 PROCEDURE — 7100000011 HC PHASE II RECOVERY - ADDTL 15 MIN: Performed by: INTERNAL MEDICINE

## 2025-05-15 PROCEDURE — 85027 COMPLETE CBC AUTOMATED: CPT

## 2025-05-15 PROCEDURE — 93312 ECHO TRANSESOPHAGEAL: CPT

## 2025-05-15 PROCEDURE — 80048 BASIC METABOLIC PNL TOTAL CA: CPT

## 2025-05-15 PROCEDURE — 93010 ELECTROCARDIOGRAM REPORT: CPT | Performed by: INTERNAL MEDICINE

## 2025-05-15 RX ORDER — SODIUM CHLORIDE 0.9 % (FLUSH) 0.9 %
5-40 SYRINGE (ML) INJECTION EVERY 12 HOURS SCHEDULED
Status: DISCONTINUED | OUTPATIENT
Start: 2025-05-15 | End: 2025-05-15 | Stop reason: HOSPADM

## 2025-05-15 RX ORDER — SODIUM CHLORIDE 9 MG/ML
INJECTION, SOLUTION INTRAVENOUS PRN
Status: DISCONTINUED | OUTPATIENT
Start: 2025-05-15 | End: 2025-05-15 | Stop reason: HOSPADM

## 2025-05-15 RX ORDER — PROPOFOL 10 MG/ML
INJECTION, EMULSION INTRAVENOUS
Status: DISCONTINUED | OUTPATIENT
Start: 2025-05-15 | End: 2025-05-15 | Stop reason: SDUPTHER

## 2025-05-15 RX ORDER — SODIUM CHLORIDE 0.9 % (FLUSH) 0.9 %
5-40 SYRINGE (ML) INJECTION PRN
Status: DISCONTINUED | OUTPATIENT
Start: 2025-05-15 | End: 2025-05-15 | Stop reason: HOSPADM

## 2025-05-15 RX ORDER — LIDOCAINE HYDROCHLORIDE 20 MG/ML
INJECTION, SOLUTION INFILTRATION; PERINEURAL
Status: DISCONTINUED | OUTPATIENT
Start: 2025-05-15 | End: 2025-05-15 | Stop reason: SDUPTHER

## 2025-05-15 RX ORDER — FAMOTIDINE 10 MG/ML
20 INJECTION, SOLUTION INTRAVENOUS ONCE
Status: DISCONTINUED | OUTPATIENT
Start: 2025-05-15 | End: 2025-05-15 | Stop reason: HOSPADM

## 2025-05-15 RX ORDER — SODIUM CHLORIDE, SODIUM LACTATE, POTASSIUM CHLORIDE, CALCIUM CHLORIDE 600; 310; 30; 20 MG/100ML; MG/100ML; MG/100ML; MG/100ML
INJECTION, SOLUTION INTRAVENOUS CONTINUOUS
Status: DISCONTINUED | OUTPATIENT
Start: 2025-05-15 | End: 2025-05-15 | Stop reason: HOSPADM

## 2025-05-15 RX ADMIN — PROPOFOL 30 MG: 10 INJECTION, EMULSION INTRAVENOUS at 08:38

## 2025-05-15 RX ADMIN — PROPOFOL 50 MG: 10 INJECTION, EMULSION INTRAVENOUS at 08:33

## 2025-05-15 RX ADMIN — PROPOFOL 20 MG: 10 INJECTION, EMULSION INTRAVENOUS at 08:35

## 2025-05-15 RX ADMIN — LIDOCAINE HYDROCHLORIDE 60 MG: 20 INJECTION, SOLUTION INFILTRATION; PERINEURAL at 08:32

## 2025-05-15 RX ADMIN — PROPOFOL 30 MG: 10 INJECTION, EMULSION INTRAVENOUS at 08:37

## 2025-05-15 RX ADMIN — PROPOFOL 30 MG: 10 INJECTION, EMULSION INTRAVENOUS at 08:43

## 2025-05-15 RX ADMIN — SODIUM CHLORIDE: 9 INJECTION, SOLUTION INTRAVENOUS at 08:29

## 2025-05-15 RX ADMIN — PROPOFOL 30 MG: 10 INJECTION, EMULSION INTRAVENOUS at 08:40

## 2025-05-15 RX ADMIN — PROPOFOL 20 MG: 10 INJECTION, EMULSION INTRAVENOUS at 08:36

## 2025-05-15 RX ADMIN — PROPOFOL 50 MG: 10 INJECTION, EMULSION INTRAVENOUS at 08:32

## 2025-05-15 ASSESSMENT — LIFESTYLE VARIABLES: SMOKING_STATUS: 0

## 2025-05-15 ASSESSMENT — ENCOUNTER SYMPTOMS: SHORTNESS OF BREATH: 1

## 2025-05-15 NOTE — SEDATION DOCUMENTATION
Brief Pre-Op Note/Sedation Assessment      Geovanni Webster  1960  [id number]  8:48 AM    Planned Procedure: ACACIA Procedure  Post Procedure Plan: Return to same level of care  Consent: I have discussed with the patient and/or the patient representative the indication, alternatives, and the possible risks and/or complications of the planned procedure and the anesthesia methods. The patient and/or patient representative appear to understand and agree to proceed.      Pre-Procedure Medical History:  Vital Signs:  /80   Ht 1.88 m (6' 2\")   Wt 122.5 kg (270 lb)   SpO2 95%   BMI 34.67 kg/m²     Allergies:    Allergies   Allergen Reactions    Penicillins     Niacin Swelling     With chills and weakness.     Medications:    Current Facility-Administered Medications   Medication Dose Route Frequency Provider Last Rate Last Admin    famotidine (PEPCID) injection 20 mg  20 mg IntraVENous Once Barber Rutherford MD        lactated ringers infusion   IntraVENous Continuous Barber Rutherford MD        sodium chloride flush 0.9 % injection 5-40 mL  5-40 mL IntraVENous 2 times per day Barber Rutherford MD        sodium chloride flush 0.9 % injection 5-40 mL  5-40 mL IntraVENous PRN Barber Rutherford MD        0.9 % sodium chloride infusion   IntraVENous PRN Barber Rutherford MD   New Bag at 05/15/25 0829    sodium chloride flush 0.9 % injection 5-40 mL  5-40 mL IntraVENous 2 times per day Blaine Bar MD        sodium chloride flush 0.9 % injection 5-40 mL  5-40 mL IntraVENous PRN Blaine Bar MD        0.9 % sodium chloride infusion   IntraVENous PRN Blaine Bar MD         Facility-Administered Medications Ordered in Other Encounters   Medication Dose Route Frequency Provider Last Rate Last Admin    lidocaine 2 % injection   IntraVENous Once PRN Lexie Corea APRN - CRNA   60 mg at 05/15/25 0832    propofol infusion   IntraVENous Once PRN eLxie Corea

## 2025-05-15 NOTE — ANESTHESIA PRE PROCEDURE
Department of Anesthesiology  Preprocedure Note       Name:  Geovanni Webster   Age:  65 y.o.  :  1960                                          MRN:  [id number]         Date:  5/15/2025      Surgeon: * No surgeons listed *    Procedure: * No procedures listed *    Medications prior to admission:   Prior to Admission medications    Medication Sig Start Date End Date Taking? Authorizing Provider   Evolocumab 140 MG/ML SOAJ Inject 140 mg into the skin every 14 days 25  Yes Blaine Bar MD   aspirin 81 MG chewable tablet Take 1 tablet by mouth daily 3/7/25  Yes Filiberto Sotelo DO   rosuvastatin (CRESTOR) 40 MG tablet Take 1 tablet by mouth nightly 3/6/25  Yes Filiberto Sotelo DO   MULTIPLE VITAMINS-MINERALS PO Take 1 tablet by mouth   Yes Provider, MD Dion   tirzepatide-weight management (ZEPBOUND) 2.5 MG/0.5ML SOAJ subCUTAneous auto-injector pen Inject 2.5 mg into the skin once a week  Patient not taking: Reported on 2025   Tammy Duffy PA-C       Current medications:    Current Facility-Administered Medications   Medication Dose Route Frequency Provider Last Rate Last Admin   • famotidine (PEPCID) injection 20 mg  20 mg IntraVENous Once Barber Rutherford MD       • lactated ringers infusion   IntraVENous Continuous Barber Rutherford MD       • sodium chloride flush 0.9 % injection 5-40 mL  5-40 mL IntraVENous 2 times per day Barber Rutherford MD       • sodium chloride flush 0.9 % injection 5-40 mL  5-40 mL IntraVENous PRN Barber Rutherford MD       • 0.9 % sodium chloride infusion   IntraVENous PRN Barber Rutherford MD       • sodium chloride flush 0.9 % injection 5-40 mL  5-40 mL IntraVENous 2 times per day Blaine Bar MD       • sodium chloride flush 0.9 % injection 5-40 mL  5-40 mL IntraVENous PRN Blaine Bar MD       • 0.9 % sodium chloride infusion   IntraVENous PRN Blaine Bar MD           Allergies:

## 2025-05-15 NOTE — ANESTHESIA POSTPROCEDURE EVALUATION
Department of Anesthesiology  Postprocedure Note    Patient: Geovanni Webster  MRN: [id number]  YOB: 1960  Date of evaluation: 5/15/2025    Procedure Summary       Date: 05/15/25 Room / Location: [facility]    Anesthesia Start: 0829 Anesthesia Stop: 0848    Procedure: ACACIA (PRN CONTRAST/BUBBLE/3D) Diagnosis:       Cerebrovascular accident (CVA), unspecified mechanism (HCC)      Cerebrovascular accident (CVA), unspecified mechanism (HCC)    Scheduled Providers: Blaine Bar MD Responsible Provider: Paul Prabhakar MD    Anesthesia Type: MAC ASA Status: 3            Anesthesia Type: No value filed.    Layne Phase I:      Layne Phase II:      Anesthesia Post Evaluation    Patient location during evaluation: PACU  Level of consciousness: awake and alert  Airway patency: patent  Nausea & Vomiting: no vomiting  Cardiovascular status: blood pressure returned to baseline  Respiratory status: acceptable  Hydration status: euvolemic  Multimodal analgesia pain management approach  Pain management: adequate    No notable events documented.

## 2025-05-15 NOTE — PROCEDURES
PROCEDURE NOTE  Date: 5/15/2025   Name: Geovanni Webster  YOB: 1960    Procedures        5/15/2025    PROCEDURE PERFORMED:     1. A Complete Transesophageal echocardiogram with color and spectral doppler and 3-D imaging was performed.     INDICATIONS: H/o CVA    PROCEDURE: The patient was brought to the lab in fasting state. The patient received adequate sedation with the assistance of anesthesia for the case. After ensuring adequate sedation, the esophagus was intubated and a complete transesophageal echocardiogram was completed. There were no complications related to the study.       CONCLUSION:   Aneurysmal interatrial septum with PFO present  2.   Small sized normal LA appendage with no thrombus      See formal report for full details.     PLAN:   1. Continue current cardiac medications.      Blaine Bar MD [facility] [place]   [phone] [facility]   [phone] [facility]

## 2025-05-15 NOTE — H&P
CARDIOLOGY CONSULTATION        Patient Name: Geovanni Webster  Date of admission: 5/15/2025  6:28 AM  Admission Dx: Cerebrovascular accident (CVA), unspecified mechanism (HCC) [I63.9]  Requesting Physician: Blaine Bar MD  Primary Care physician: Jose Cruz Arita DO    Reason for Consultation/Chief Complaint: H/o CVA    History of Present Illness:     Geovanni Webster is a 65 y.o. with a past medical history of arthritis, GERD, HLD, sleep apnea, and vaso-vagal syncope. Patient presented to ER 3/5/25 for vision changes, aphasia and brain fog. CT head was negative, MRI brain demonstrated small patchy acute infarcts in the right occipital lobe, and mild atrophy and chronic small vessel ischemic change. Small remote lacunar infarcts in the left basal ganglia. Dr. Brennan saw patient in the hospital for vaso-vagal syncope after seeing blood from his IV. He recommended a 30 day event monitor and an echocardiogram. He discharged 3/7/25.                 Patient saw me in 04/2025 to establish care.  We discussed echo and  MRI findings and the need to proceed with ACACIA.  He states he is doing well and presents today to complete ACACIA.       Past Medical History:   has a past medical history of Arthritis, GERD (gastroesophageal reflux disease), Hyperlipidemia, and Sleep apnea.    Surgical History:   has a past surgical history that includes knee surgery; Hand surgery; Tonsillectomy; LASIK (Bilateral); and Knee arthroscopy (Right, 11/16/2017).     Social History:   reports that he has never smoked. He has never used smokeless tobacco. He reports that he does not drink alcohol and does not use drugs.     Family History:  family history includes Heart Disease in his father; High Blood Pressure in his mother.      Home Medications:  Were reviewed and are listed in nursing record and/or below  Prior to Admission medications    Medication Sig Start Date End Date Taking? Authorizing Provider   Evolocumab 140 MG/ML SOAJ Inject 140

## 2025-05-15 NOTE — TELEPHONE ENCOUNTER
----- Message from Dr. Blaine Bar MD sent at 5/15/2025  8:54 AM EDT -----  Regarding: abnormal ACACIA  ACACIA completed today showing PFO.  Please refer to structural clinic with Dr Mars to evaluate for possible PFO closure.    Thx

## 2025-05-15 NOTE — PROGRESS NOTES
VSS room air. Discharge instructions discussed with patient and his wife Susanne. Questions addressed. IV removed and patient got dressed. Wheeled to front entrance where Susanne picked him up.

## 2025-05-19 NOTE — TELEPHONE ENCOUNTER
Left message for patient to return call. Okay to offer appointment at [facility] (if this is the closest location for him) 6/13 at 848

## 2025-05-30 NOTE — PROGRESS NOTES
[facility]  Cardiology Consult    Geovanni Webster  1960    May 30, 2025      Reason for Referral: PFO    Referring physician: Dr Bar    CC: ***      Subjective:     History of Present Illness:    Geovanni Webster is a 65 y.o. patient with a PMH significant for GERD and hyperlipidemia. He presented to the emergency room on 3/5/2025 with left eye vision changes. MRI revealed small patchy acute infarcts in the right occipital lobe.         Past Medical History:   has a past medical history of Arthritis, GERD (gastroesophageal reflux disease), Hyperlipidemia, and Sleep apnea.    Surgical History:   has a past surgical history that includes knee surgery; Hand surgery; Tonsillectomy; LASIK (Bilateral); and Knee arthroscopy (Right, 11/16/2017).     Social History:   reports that he has never smoked. He has never used smokeless tobacco. He reports that he does not drink alcohol and does not use drugs.     Family History:  family history includes Heart Disease in his father; High Blood Pressure in his mother.    Home Medications:  Were reviewed and are listed in nursing record and/or below  Prior to Admission medications    Medication Sig Start Date End Date Taking? Authorizing Provider   Evolocumab 140 MG/ML SOAJ Inject 140 mg into the skin every 14 days 4/17/25   Blaine Bar MD   tirzepatide-weight management (ZEPBOUND) 2.5 MG/0.5ML SOAJ subCUTAneous auto-injector pen Inject 2.5 mg into the skin once a week  Patient not taking: Reported on 4/17/2025 4/9/25   Tammy Duffy PA-C   aspirin 81 MG chewable tablet Take 1 tablet by mouth daily 3/7/25   Filiberto Sotelo, DO   rosuvastatin (CRESTOR) 40 MG tablet Take 1 tablet by mouth nightly 3/6/25   Filiberto Sotelo, DO   MULTIPLE VITAMINS-MINERALS PO Take 1 tablet by mouth    Provider, MD Dion        CURRENT Medications:  No current facility-administered medications for this visit.      Allergies:  Penicillins and Niacin

## 2025-06-05 ENCOUNTER — OFFICE VISIT (OUTPATIENT)
Dept: CARDIOLOGY CLINIC | Age: 65
End: 2025-06-05
Payer: COMMERCIAL

## 2025-06-05 VITALS
BODY MASS INDEX: 34.72 KG/M2 | WEIGHT: 270.5 LBS | SYSTOLIC BLOOD PRESSURE: 130 MMHG | HEART RATE: 74 BPM | HEIGHT: 74 IN | OXYGEN SATURATION: 94 % | DIASTOLIC BLOOD PRESSURE: 72 MMHG

## 2025-06-05 DIAGNOSIS — Q21.12 PFO (PATENT FORAMEN OVALE): Primary | ICD-10-CM

## 2025-06-05 DIAGNOSIS — G47.30 SLEEP APNEA, UNSPECIFIED TYPE: ICD-10-CM

## 2025-06-05 DIAGNOSIS — I61.9 CVA (CEREBROVASCULAR ACCIDENT DUE TO INTRACEREBRAL HEMORRHAGE) (HCC): ICD-10-CM

## 2025-06-05 DIAGNOSIS — R94.39 ABNORMAL STRESS TEST: ICD-10-CM

## 2025-06-05 DIAGNOSIS — E78.2 MIXED HYPERLIPIDEMIA: ICD-10-CM

## 2025-06-05 DIAGNOSIS — R93.1 ELEVATED CORONARY ARTERY CALCIUM SCORE: ICD-10-CM

## 2025-06-05 PROCEDURE — 99214 OFFICE O/P EST MOD 30 MIN: CPT | Performed by: INTERNAL MEDICINE

## 2025-06-05 PROCEDURE — G8417 CALC BMI ABV UP PARAM F/U: HCPCS | Performed by: INTERNAL MEDICINE

## 2025-06-05 PROCEDURE — 1036F TOBACCO NON-USER: CPT | Performed by: INTERNAL MEDICINE

## 2025-06-05 PROCEDURE — G2211 COMPLEX E/M VISIT ADD ON: HCPCS | Performed by: INTERNAL MEDICINE

## 2025-06-05 PROCEDURE — 3017F COLORECTAL CA SCREEN DOC REV: CPT | Performed by: INTERNAL MEDICINE

## 2025-06-05 PROCEDURE — G8427 DOCREV CUR MEDS BY ELIG CLIN: HCPCS | Performed by: INTERNAL MEDICINE

## 2025-06-05 RX ORDER — ASPIRIN 81 MG/1
81 TABLET, CHEWABLE ORAL DAILY
Qty: 90 TABLET | Refills: 3 | Status: SHIPPED | OUTPATIENT
Start: 2025-06-05

## 2025-06-05 RX ORDER — ROSUVASTATIN CALCIUM 40 MG/1
40 TABLET, COATED ORAL NIGHTLY
Qty: 90 TABLET | Refills: 3 | Status: SHIPPED | OUTPATIENT
Start: 2025-06-05

## 2025-06-05 NOTE — PROGRESS NOTES
CARDIOLOGY OFFICE VISIT      Patient Name: Geovanni Webster  Primary Care physician: Jose Cruz Arita DO    Reason for Referral/Chief Complaint: Geovanni Webster is a 65 y.o. patient who is presenting to cardiology clinic today for evaluation and treatment of syncope.     History of Present Illness:   Geovanni Webster is a 65 y.o. with a past medical history of arthritis, GERD, HLD, sleep apnea, and vaso-vagal syncope. Patient presented to ER 3/5/25 for vision changes, aphasia and brain fog. CT head was negative, MRI brain demonstrated small patchy acute infarcts in the right occipital lobe, and mild atrophy and chronic small vessel ischemic change. Small remote lacunar infarcts in the left basal ganglia. Dr. Brennan saw patient in the hospital for vaso-vagal syncope after seeing blood from his IV. He recommended a 30 day event monitor and an echocardiogram. He discharged 3/7/25.   Last office visit, 4/17/25, Patient denied current edema, chest pain, shortness of breath, palpitations, dizziness or syncope.  He denied any residual weakness from his stroke last month and states he had no recurrence of aphasia, vision changes or dizziness. 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 He was a non smoker,  and he stated he had second hand smoke exposure in his youth from his parents. Denied mariajuana and illicit drug use. He drinks alcohol occasionally.  MRI brain results reviewed showing prior infarcts.   Today, 6/5/25, he states he is doing well. He presents with his son. Patient denies current edema, chest pain, shortness of breath, palpitations, dizziness or syncope.  Discussed cardiac testing results in detail. He has an appointment with Dr. Mars for PFO closure on 6/13/25. Patient is taking all cardiac medications as prescribed and tolerates them well.      Past Medical History:   has a past medical history of Arthritis, GERD (gastroesophageal reflux disease), Hyperlipidemia, and Sleep apnea.    Surgical

## 2025-06-05 NOTE — PATIENT INSTRUCTIONS
Plan:  Recommend left heart catheterization to further evaluate your heart for blockages. We will contact Dr. Mars's team to see if this can be completed at the same time as PFO closure if possible.   Follow up with Dr. Mars as scheduled on 6/13/25.   Continue prescribed medications as ordered.   Follow up with me in 2-3 months.

## 2025-06-10 RX ORDER — ROSUVASTATIN CALCIUM 40 MG/1
40 TABLET, COATED ORAL NIGHTLY
Qty: 90 TABLET | Refills: 3 | OUTPATIENT
Start: 2025-06-10

## 2025-06-12 NOTE — PROGRESS NOTES
[facility]  Cardiology Consult    Geovanni Webster  1960 June 12, 2025      Reason for Referral: PFO    Referring physician: Dr Bar    CC: \"I was feeling off\"      Subjective:     History of Present Illness:    Geovanni Webster is a 65 y.o. patient with a PMH significant for GERD and hyperlipidemia. He presented to the emergency room on 3/5/2025 with left eye vision changes. MRI revealed small patchy acute infarcts in the right occipital lobe. ACACIA 5/15/25 noted right to left shunt. Patient here today as new referral from Dr. Bar to further discuss PFO.      Past Medical History:   has a past medical history of Arthritis, GERD (gastroesophageal reflux disease), Hyperlipidemia, and Sleep apnea.    Surgical History:   has a past surgical history that includes knee surgery; Hand surgery; Tonsillectomy; LASIK (Bilateral); and Knee arthroscopy (Right, 11/16/2017).     Social History:   reports that he has never smoked. He has never used smokeless tobacco. He reports current alcohol use. He reports that he does not use drugs.     Family History:  family history includes Heart Disease in his father; High Blood Pressure in his mother.    Home Medications:  Were reviewed and are listed in nursing record and/or below  Prior to Admission medications    Medication Sig Start Date End Date Taking? Authorizing Provider   rosuvastatin (CRESTOR) 40 MG tablet Take 1 tablet by mouth nightly 6/5/25   Blaine Bar MD   aspirin 81 MG chewable tablet Take 1 tablet by mouth daily 6/5/25   Blaine Bar MD   Evolocumab 140 MG/ML SOAJ Inject 140 mg into the skin every 14 days 4/17/25   Blaine Bar MD   tirzepatide-weight management (ZEPBOUND) 2.5 MG/0.5ML SOAJ subCUTAneous auto-injector pen Inject 2.5 mg into the skin once a week  Patient not taking: Reported on 4/17/2025 4/9/25   Tammy Duffy PA-C   MULTIPLE VITAMINS-MINERALS PO Take 1 tablet by mouth    Provider, MD Dion        CURRENT

## 2025-06-13 ENCOUNTER — OFFICE VISIT (OUTPATIENT)
Age: 65
End: 2025-06-13
Payer: COMMERCIAL

## 2025-06-13 ENCOUNTER — TELEPHONE (OUTPATIENT)
Dept: CARDIOLOGY CLINIC | Age: 65
End: 2025-06-13

## 2025-06-13 VITALS
OXYGEN SATURATION: 94 % | WEIGHT: 268.6 LBS | DIASTOLIC BLOOD PRESSURE: 70 MMHG | BODY MASS INDEX: 34.47 KG/M2 | HEIGHT: 74 IN | HEART RATE: 64 BPM | SYSTOLIC BLOOD PRESSURE: 118 MMHG

## 2025-06-13 DIAGNOSIS — I63.9 CEREBROVASCULAR ACCIDENT (CVA), UNSPECIFIED MECHANISM (HCC): Primary | ICD-10-CM

## 2025-06-13 DIAGNOSIS — E78.2 MIXED HYPERLIPIDEMIA: ICD-10-CM

## 2025-06-13 DIAGNOSIS — Q21.12 PFO (PATENT FORAMEN OVALE): ICD-10-CM

## 2025-06-13 DIAGNOSIS — R93.1 ELEVATED CORONARY ARTERY CALCIUM SCORE: Primary | ICD-10-CM

## 2025-06-13 DIAGNOSIS — I63.9 CEREBROVASCULAR ACCIDENT (CVA), UNSPECIFIED MECHANISM (HCC): ICD-10-CM

## 2025-06-13 LAB
ANION GAP SERPL CALCULATED.3IONS-SCNC: 11 MMOL/L (ref 3–16)
BUN SERPL-MCNC: 15 MG/DL (ref 7–20)
CALCIUM SERPL-MCNC: 10.2 MG/DL (ref 8.3–10.6)
CHLORIDE SERPL-SCNC: 105 MMOL/L (ref 99–110)
CHOLEST SERPL-MCNC: 79 MG/DL (ref 0–199)
CO2 SERPL-SCNC: 24 MMOL/L (ref 21–32)
CREAT SERPL-MCNC: 1.1 MG/DL (ref 0.8–1.3)
DEPRECATED RDW RBC AUTO: 13.6 % (ref 12.4–15.4)
GFR SERPLBLD CREATININE-BSD FMLA CKD-EPI: 75 ML/MIN/{1.73_M2}
GLUCOSE SERPL-MCNC: 91 MG/DL (ref 70–99)
HCT VFR BLD AUTO: 43.2 % (ref 40.5–52.5)
HDLC SERPL-MCNC: 57 MG/DL (ref 40–60)
HGB BLD-MCNC: 15 G/DL (ref 13.5–17.5)
LDLC SERPL CALC-MCNC: 10 MG/DL
MCH RBC QN AUTO: 30.2 PG (ref 26–34)
MCHC RBC AUTO-ENTMCNC: 34.6 G/DL (ref 31–36)
MCV RBC AUTO: 87.4 FL (ref 80–100)
PLATELET # BLD AUTO: 215 K/UL (ref 135–450)
PMV BLD AUTO: 8.7 FL (ref 5–10.5)
POTASSIUM SERPL-SCNC: 4.7 MMOL/L (ref 3.5–5.1)
RBC # BLD AUTO: 4.94 M/UL (ref 4.2–5.9)
SODIUM SERPL-SCNC: 140 MMOL/L (ref 136–145)
TRIGL SERPL-MCNC: 58 MG/DL (ref 0–150)
VLDLC SERPL CALC-MCNC: 12 MG/DL
WBC # BLD AUTO: 9 K/UL (ref 4–11)

## 2025-06-13 PROCEDURE — G8417 CALC BMI ABV UP PARAM F/U: HCPCS | Performed by: INTERNAL MEDICINE

## 2025-06-13 PROCEDURE — G8427 DOCREV CUR MEDS BY ELIG CLIN: HCPCS | Performed by: INTERNAL MEDICINE

## 2025-06-13 PROCEDURE — 99214 OFFICE O/P EST MOD 30 MIN: CPT | Performed by: INTERNAL MEDICINE

## 2025-06-13 PROCEDURE — 1036F TOBACCO NON-USER: CPT | Performed by: INTERNAL MEDICINE

## 2025-06-13 PROCEDURE — 3017F COLORECTAL CA SCREEN DOC REV: CPT | Performed by: INTERNAL MEDICINE

## 2025-06-13 RX ORDER — CLOPIDOGREL BISULFATE 75 MG/1
75 TABLET ORAL DAILY
Qty: 90 TABLET | Refills: 0 | Status: SHIPPED | OUTPATIENT
Start: 2025-06-13

## 2025-06-13 NOTE — TELEPHONE ENCOUNTER
Sharlene  Please assist with scheduling patient for left heart catheterization with possible intervention with Dr. Mars at the same time of PFO closure  Orders in  Thanks     Please go for blood work one week prior to your procedure.       The morning of your procedure you will park in the hospital parking lot and report directly to the registration desk for check in.    Pre-Procedure Instructions   You will need to fast for at least 8 hours prior to procedure. No caffeine the morning of.   Hold all diabetic medications including, Metfomin.  If you take Lantus/Levemir only take ½ your normal dose the evening before.  All other medications can be taken in the morning with sips of water.   You will need to take 325 mg aspirin the morning of.  If you are currently taking 81 mg please take 4 tablets that morning.   Do not use any lotions, creams or perfume the morning of procedure.   Pre-procedure lab work will need to be completed 5-7 days prior to procedure.   Please have a responsible adult to drive you home after procedure. We advise you have someone to stay with you for 24 hours following procedure for precautionary measures. Depending on procedure you may require an overnight stay.   Cath lab will provide you with all post procedure instructions.     If you have any questions regarding the procedure itself or medications, please call [phone] and ask to speak with a nurse.

## 2025-06-15 LAB
PROT C ACT/NOR PPP: 126 % (ref 83–168)
PROT S ACT/NOR PPP: 110 % (ref 66–143)

## 2025-06-16 ENCOUNTER — RESULTS FOLLOW-UP (OUTPATIENT)
Dept: CARDIOLOGY CLINIC | Age: 65
End: 2025-06-16

## 2025-06-16 ENCOUNTER — OFFICE VISIT (OUTPATIENT)
Dept: NEUROLOGY | Age: 65
End: 2025-06-16
Payer: COMMERCIAL

## 2025-06-16 VITALS
SYSTOLIC BLOOD PRESSURE: 125 MMHG | HEART RATE: 63 BPM | OXYGEN SATURATION: 95 % | WEIGHT: 268.9 LBS | DIASTOLIC BLOOD PRESSURE: 72 MMHG | HEIGHT: 74 IN | BODY MASS INDEX: 34.51 KG/M2

## 2025-06-16 DIAGNOSIS — Z86.73 HISTORY OF STROKE: Primary | ICD-10-CM

## 2025-06-16 DIAGNOSIS — I67.1 CEREBRAL ANEURYSM: ICD-10-CM

## 2025-06-16 LAB
B2 MICROGLOB SERPL-MCNC: 2.3 MG/L
CARDIOLIPIN IGG SER IA-ACNC: <10 GPL
CARDIOLIPIN IGM SER IA-ACNC: <10 MPL

## 2025-06-16 PROCEDURE — 3017F COLORECTAL CA SCREEN DOC REV: CPT | Performed by: STUDENT IN AN ORGANIZED HEALTH CARE EDUCATION/TRAINING PROGRAM

## 2025-06-16 PROCEDURE — G8417 CALC BMI ABV UP PARAM F/U: HCPCS | Performed by: STUDENT IN AN ORGANIZED HEALTH CARE EDUCATION/TRAINING PROGRAM

## 2025-06-16 PROCEDURE — 99214 OFFICE O/P EST MOD 30 MIN: CPT | Performed by: STUDENT IN AN ORGANIZED HEALTH CARE EDUCATION/TRAINING PROGRAM

## 2025-06-16 PROCEDURE — 1036F TOBACCO NON-USER: CPT | Performed by: STUDENT IN AN ORGANIZED HEALTH CARE EDUCATION/TRAINING PROGRAM

## 2025-06-16 PROCEDURE — G8427 DOCREV CUR MEDS BY ELIG CLIN: HCPCS | Performed by: STUDENT IN AN ORGANIZED HEALTH CARE EDUCATION/TRAINING PROGRAM

## 2025-06-16 NOTE — PATIENT INSTRUCTIONS
Call 911 if you develop sudden onset new neurological signs or symptoms of stroke including the following:     BE FAST Warning Signs  Use the letters in BE FAST to spot a Stroke  B = Balance - Do they have loss of balance or are they dizzy? Are they walking differently?  E = Eyes - Can they see out of both eyes OK? As them if they have sudden vision loss or blurry or double vision.   F = Face Drooping - Does one side of the face droop or is it numb? Ask them to smile. Is the smile uneven?  A = Arm Weakness - Is one arm weak or numb? Ask them to raise both arms. Does one arm drift downward?  S = Speech Difficulty - Is speech slurred?  T = Time to call 911 - Stroke is an emergency. Every minute counts. Call 911 immediately. Note the time when any of the symptoms first appear.    Other Stroke Symptoms  Watch for Sudden:  NUMBNESS or weakness of face, arm, or leg, especially on one side of the body  CONFUSION, trouble speaking or understanding speech  TROUBLE SEEING in one or both eyes  TROUBLE WALKING, dizziness, loss of balance or coordination  SEVERE HEADACHE with no known cause    Learn more about signs and symptoms of stroke at this web site: <https://www.stroke.org/en/about-stroke/stroke-symptoms>.

## 2025-06-16 NOTE — PROGRESS NOTES
History of Present Illness  Hospital follow up. Accompanied by his wife.     He experienced a sudden loss of consciousness while attempting to use the bathroom, followed by a transient episode of blurred vision on the left side and difficulty comprehending conversations. These symptoms, which lasted approximately 24 hours, have since resolved. He reports no residual confusion, dizziness, or visual disturbances.     A transesophageal echocardiogram was performed in 05/2025 due to the stroke, revealing a patent foramen ovale (PFO). He is currently awaiting scheduling for a procedure to close the PFO and possibly insert stents. He has no history of previous strokes. He was informed that he had two strokes, but this is his first experience with an aneurysm. He reports no chest pain.     He is on Repatha and Crestor. He was recently started on Plavix, with three doses administered so far, due to concerns about coronary artery disease. He has a history of migraines but has not experienced one in the past 10 to 15 years.    He has been diagnosed with mild sleep apnea and was advised to use a CPAP machine. However, he was also informed that weight loss of 15 pounds could potentially eliminate the need for the CPAP machine.    FAMILY HISTORY  - Paternal grandmother: Stroke    /72   Pulse 63   Ht 1.88 m (6' 2\")   Wt 122 kg (268 lb 14.4 oz)   SpO2 95%   BMI 34.52 kg/m²       Neurological Exam  Mental Status  Awake and alert. Speech is normal. Fund of knowledge is appropriate for level of education.    Cranial Nerves  CN III, IV, VI: Extraocular movements intact bilaterally. No nystagmus. Normal smooth pursuit. Normal lids and orbits bilaterally.  CN VII:  Right: There is no facial weakness.  Left: There is no facial weakness.  CN VIII:  Right: Hearing is normal.  Left: Hearing is normal.    Motor   No abnormal involuntary movements. No pronator drift.    Coordination  Right: Finger-to-nose normal. Rapid alternating

## 2025-06-17 LAB
APTT SCREEN TO CONFIRM RATIO: 1.03 {RATIO}
CONFIRM DRVVT STA-STACLOT: NORMAL S
DRVVT SCREEN TO CONFIRM RATIO: 1.03 {RATIO}
DRVVT SCREEN TO CONFIRM RATIO: NORMAL {RATIO}
DRVVT/DRVVT CFM P DOAC NEUT NORM PPP-RTO: NORMAL {RATIO}
HEPARIN NT PPP QL: NORMAL
LA 3 SCREEN W REFLEX-IMP: NORMAL
LMW HEPARIN IND PLT AB SER QL: NORMAL
MIXING DRVVT: NORMAL S
PROTHROMBIN TIME: 13.4 S (ref 12–15.5)
SCREEN APTT: NORMAL S
THROMBIN TIME: NORMAL S

## 2025-06-18 LAB
F2 C.20210G>A GENO BLD/T: NEGATIVE
F5 P.R506Q BLD/T QL: NEGATIVE
SPECIMEN SOURCE: NORMAL
SPECIMEN SOURCE: NORMAL

## 2025-06-23 NOTE — TELEPHONE ENCOUNTER
Noted. Thank you.    Published on [employer] and email sent to [facility] lab.

## 2025-06-23 NOTE — TELEPHONE ENCOUNTER
[facility] scheduled for 7/11 at 730 at [facility]   Arrive at 630    The morning of your procedure you will park in the hospital parking lot and report directly to the cath lab to check in.     Pre-Procedure Instructions   You will need to fast for at least 8 hours prior to procedure. No caffeine the morning of.   All other medications can be taken in the morning with sips of water.   Do not use any lotions, creams or perfume the morning of procedure.   Please have a responsible adult to drive you home after procedure. We advise you have someone to stay with you for 24 hours following procedure for precautionary measures. Depending on procedure you may require an overnight stay.   Cath lab will provide you with all post procedure instructions.     If you have any questions regarding the procedure itself or medications, please call [phone] and ask to speak with a nurse.     Patient is agreeable to date and time. Reviewed instructions with him. Advised him to call with any questions or concerns. He verbalized understanding. Also sent instructions via [employer].

## 2025-07-10 NOTE — PRE-PROCEDURE INSTRUCTIONS
Called patient about procedure. Told to be here at 0630 for procedure at 0730. Must be NPO after midnight but can take morning medication with sips of water. Patient takes plavix and aspirin, no other blood thinners. Told to have a responsible adult with them to take them home and stay with them afterwards, if they do not get admitted to hospital. Also, to bring a current list of medications. No other questions or concerns.   Oxybutynin Pregnancy And Lactation Text: This medication is Pregnancy Category B and is considered safe during pregnancy. It is unknown if it is excreted in breast milk.

## 2025-07-11 ENCOUNTER — HOSPITAL ENCOUNTER (OUTPATIENT)
Age: 65
Setting detail: OUTPATIENT SURGERY
Discharge: HOME | End: 2025-07-11
Attending: INTERNAL MEDICINE | Admitting: INTERNAL MEDICINE
Payer: COMMERCIAL

## 2025-07-11 VITALS
RESPIRATION RATE: 14 BRPM | WEIGHT: 267.8 LBS | HEART RATE: 60 BPM | BODY MASS INDEX: 34.37 KG/M2 | TEMPERATURE: 98 F | SYSTOLIC BLOOD PRESSURE: 115 MMHG | OXYGEN SATURATION: 95 % | HEIGHT: 74 IN | DIASTOLIC BLOOD PRESSURE: 68 MMHG

## 2025-07-11 DIAGNOSIS — R93.1 ELEVATED CORONARY ARTERY CALCIUM SCORE: ICD-10-CM

## 2025-07-11 LAB
ALBUMIN SERPL-MCNC: 3.9 G/DL (ref 3.4–5)
ALBUMIN/GLOB SERPL: 1.1 {RATIO} (ref 1.1–2.2)
ALP SERPL-CCNC: 79 U/L (ref 40–129)
ALT SERPL-CCNC: 25 U/L (ref 10–40)
ANION GAP SERPL CALCULATED.3IONS-SCNC: 12 MMOL/L (ref 3–16)
AST SERPL-CCNC: 27 U/L (ref 15–37)
BILIRUB SERPL-MCNC: 0.8 MG/DL (ref 0–1)
BUN SERPL-MCNC: 18 MG/DL (ref 7–20)
CALCIUM SERPL-MCNC: 9.6 MG/DL (ref 8.3–10.6)
CHLORIDE SERPL-SCNC: 105 MMOL/L (ref 99–110)
CHOLEST SERPL-MCNC: 104 MG/DL (ref 0–199)
CO2 SERPL-SCNC: 21 MMOL/L (ref 21–32)
CREAT SERPL-MCNC: 1.1 MG/DL (ref 0.8–1.3)
DEPRECATED RDW RBC AUTO: 14 % (ref 12.4–15.4)
ECHO BSA: 2.52 M2
EKG ATRIAL RATE: 63 BPM
EKG DIAGNOSIS: NORMAL
EKG P AXIS: 55 DEGREES
EKG P-R INTERVAL: 208 MS
EKG Q-T INTERVAL: 422 MS
EKG QRS DURATION: 82 MS
EKG QTC CALCULATION (BAZETT): 431 MS
EKG R AXIS: 41 DEGREES
EKG T AXIS: 67 DEGREES
EKG VENTRICULAR RATE: 63 BPM
GFR SERPLBLD CREATININE-BSD FMLA CKD-EPI: 75 ML/MIN/{1.73_M2}
GLUCOSE SERPL-MCNC: 97 MG/DL (ref 70–99)
HCT VFR BLD AUTO: 43.8 % (ref 40.5–52.5)
HDLC SERPL-MCNC: 48 MG/DL (ref 40–60)
HGB BLD-MCNC: 15.1 G/DL (ref 13.5–17.5)
INR PPP: 1.06 (ref 0.86–1.14)
LDLC SERPL CALC-MCNC: 36 MG/DL
MCH RBC QN AUTO: 30 PG (ref 26–34)
MCHC RBC AUTO-ENTMCNC: 34.5 G/DL (ref 31–36)
MCV RBC AUTO: 87.1 FL (ref 80–100)
PLATELET # BLD AUTO: 212 K/UL (ref 135–450)
PMV BLD AUTO: 8.4 FL (ref 5–10.5)
POTASSIUM SERPL-SCNC: 4 MMOL/L (ref 3.5–5.1)
PROT SERPL-MCNC: 7.4 G/DL (ref 6.4–8.2)
PROTHROMBIN TIME: 14.1 SEC (ref 12.1–14.9)
RBC # BLD AUTO: 5.03 M/UL (ref 4.2–5.9)
SODIUM SERPL-SCNC: 138 MMOL/L (ref 136–145)
TRIGL SERPL-MCNC: 99 MG/DL (ref 0–150)
VLDLC SERPL CALC-MCNC: 20 MG/DL
WBC # BLD AUTO: 9.4 K/UL (ref 4–11)

## 2025-07-11 PROCEDURE — 93005 ELECTROCARDIOGRAM TRACING: CPT | Performed by: INTERNAL MEDICINE

## 2025-07-11 PROCEDURE — 93458 L HRT ARTERY/VENTRICLE ANGIO: CPT | Performed by: INTERNAL MEDICINE

## 2025-07-11 PROCEDURE — 6360000004 HC RX CONTRAST MEDICATION: Performed by: INTERNAL MEDICINE

## 2025-07-11 PROCEDURE — 80053 COMPREHEN METABOLIC PANEL: CPT

## 2025-07-11 PROCEDURE — 93010 ELECTROCARDIOGRAM REPORT: CPT | Performed by: INTERNAL MEDICINE

## 2025-07-11 PROCEDURE — 99152 MOD SED SAME PHYS/QHP 5/>YRS: CPT | Performed by: INTERNAL MEDICINE

## 2025-07-11 PROCEDURE — 2709999900 HC NON-CHARGEABLE SUPPLY: Performed by: INTERNAL MEDICINE

## 2025-07-11 PROCEDURE — 2580000003 HC RX 258: Performed by: INTERNAL MEDICINE

## 2025-07-11 PROCEDURE — 80061 LIPID PANEL: CPT

## 2025-07-11 PROCEDURE — 7100000011 HC PHASE II RECOVERY - ADDTL 15 MIN: Performed by: INTERNAL MEDICINE

## 2025-07-11 PROCEDURE — 6370000000 HC RX 637 (ALT 250 FOR IP): Performed by: INTERNAL MEDICINE

## 2025-07-11 PROCEDURE — 7100000010 HC PHASE II RECOVERY - FIRST 15 MIN: Performed by: INTERNAL MEDICINE

## 2025-07-11 PROCEDURE — 85027 COMPLETE CBC AUTOMATED: CPT

## 2025-07-11 PROCEDURE — C1894 INTRO/SHEATH, NON-LASER: HCPCS | Performed by: INTERNAL MEDICINE

## 2025-07-11 PROCEDURE — 6360000002 HC RX W HCPCS: Performed by: INTERNAL MEDICINE

## 2025-07-11 PROCEDURE — 2500000003 HC RX 250 WO HCPCS: Performed by: INTERNAL MEDICINE

## 2025-07-11 PROCEDURE — C1769 GUIDE WIRE: HCPCS | Performed by: INTERNAL MEDICINE

## 2025-07-11 PROCEDURE — 85610 PROTHROMBIN TIME: CPT

## 2025-07-11 PROCEDURE — 36415 COLL VENOUS BLD VENIPUNCTURE: CPT

## 2025-07-11 RX ORDER — SODIUM CHLORIDE 9 MG/ML
INJECTION, SOLUTION INTRAVENOUS CONTINUOUS
Status: DISCONTINUED | OUTPATIENT
Start: 2025-07-11 | End: 2025-07-11 | Stop reason: HOSPADM

## 2025-07-11 RX ORDER — LIDOCAINE HYDROCHLORIDE 10 MG/ML
INJECTION, SOLUTION INFILTRATION; PERINEURAL PRN
Status: DISCONTINUED | OUTPATIENT
Start: 2025-07-11 | End: 2025-07-11 | Stop reason: HOSPADM

## 2025-07-11 RX ORDER — SODIUM CHLORIDE 0.9 % (FLUSH) 0.9 %
5-40 SYRINGE (ML) INJECTION PRN
Status: DISCONTINUED | OUTPATIENT
Start: 2025-07-11 | End: 2025-07-11 | Stop reason: HOSPADM

## 2025-07-11 RX ORDER — MIDAZOLAM HYDROCHLORIDE 1 MG/ML
INJECTION, SOLUTION INTRAMUSCULAR; INTRAVENOUS PRN
Status: DISCONTINUED | OUTPATIENT
Start: 2025-07-11 | End: 2025-07-11 | Stop reason: HOSPADM

## 2025-07-11 RX ORDER — SODIUM CHLORIDE 0.9 % (FLUSH) 0.9 %
5-40 SYRINGE (ML) INJECTION EVERY 12 HOURS SCHEDULED
Status: DISCONTINUED | OUTPATIENT
Start: 2025-07-11 | End: 2025-07-11 | Stop reason: HOSPADM

## 2025-07-11 RX ORDER — ACETAMINOPHEN 325 MG/1
650 TABLET ORAL EVERY 4 HOURS PRN
Status: DISCONTINUED | OUTPATIENT
Start: 2025-07-11 | End: 2025-07-11 | Stop reason: HOSPADM

## 2025-07-11 RX ORDER — SODIUM CHLORIDE 9 MG/ML
INJECTION, SOLUTION INTRAVENOUS PRN
Status: DISCONTINUED | OUTPATIENT
Start: 2025-07-11 | End: 2025-07-11 | Stop reason: HOSPADM

## 2025-07-11 RX ORDER — IOPAMIDOL 755 MG/ML
INJECTION, SOLUTION INTRAVASCULAR PRN
Status: DISCONTINUED | OUTPATIENT
Start: 2025-07-11 | End: 2025-07-11 | Stop reason: HOSPADM

## 2025-07-11 RX ORDER — ASPIRIN 325 MG
325 TABLET ORAL ONCE
Status: COMPLETED | OUTPATIENT
Start: 2025-07-11 | End: 2025-07-11

## 2025-07-11 RX ADMIN — ASPIRIN 325 MG: 325 TABLET ORAL at 07:08

## 2025-07-11 RX ADMIN — SODIUM CHLORIDE: 9 INJECTION, SOLUTION INTRAVENOUS at 08:30

## 2025-07-11 NOTE — H&P
Referring physician: Dr Bar     CC: \"I was feeling off\"        Subjective:      History of Present Illness:     Geovanni Webster is a 65 y.o. patient with a PMH significant for GERD and hyperlipidemia. He presented to the emergency room on 3/5/2025 with left eye vision changes. MRI revealed small patchy acute infarcts in the right occipital lobe. [facility]IA 5/15/25 noted right to left shunt. Patient here today as new referral from Dr. Bar to further discuss PFO.      Past Medical History:   has a past medical history of Arthritis, GERD (gastroesophageal reflux disease), Hyperlipidemia, and Sleep apnea.     Surgical History:   has a past surgical history that includes knee surgery; Hand surgery; Tonsillectomy; LASIK (Bilateral); and Knee arthroscopy (Right, 11/16/2017).      Social History:   reports that he has never smoked. He has never used smokeless tobacco. He reports current alcohol use. He reports that he does not use drugs.      Family History:  family history includes Heart Disease in his father; High Blood Pressure in his mother.     Home Medications:  Were reviewed and are listed in nursing record and/or below  Home Medications           Prior to Admission medications    Medication Sig Start Date End Date Taking? Authorizing Provider   rosuvastatin (CRESTOR) 40 MG tablet Take 1 tablet by mouth nightly 6/5/25     Blaine Bar MD   aspirin 81 MG chewable tablet Take 1 tablet by mouth daily 6/5/25     Blaine Bar MD   Evolocumab 140 MG/ML SOAJ Inject 140 mg into the skin every 14 days 4/17/25     Blaine Bar MD   tirzepatide-weight management (ZEPBOUND) 2.5 MG/0.5ML SOAJ subCUTAneous auto-injector pen Inject 2.5 mg into the skin once a week  Patient not taking: Reported on 4/17/2025 4/9/25     CliveTammy PA-C   MULTIPLE VITAMINS-MINERALS PO Take 1 tablet by mouth       Provider, MD Dion            CURRENT Medications:    Current Meds Link used for Sign Out Report   No current

## 2025-07-11 NOTE — DISCHARGE INSTRUCTIONS
[facility]  Cardiovascular Special Procedures   Radial/Brachial Access Angiogram  Patient Discharge Instructions      Day 1 (Procedure Day):  Rest for the remainder of the day.  Avoid excessive use of the affected arm.  Do not drive a car.  Do not be alone.  Leave dressing intact.    Day 2:  You may drive a car, unless otherwise directed by physician.  Remove dressing.  You may bathe/shower, but wash gently around the puncture site and pat dry.  Place new band-aid on site daily until skin heals.      Things to Avoid:  You may not do any strenuous exercises for one week. (ex: golfing, bowling, tennis, vacuum, snow removal, jogging, and aerobic exercises).  No hot tubs, baths, or pools for 3-5 days.  Do not lift anything over 3-5 pounds for 3 days, with affected arm.  No lotions, powders, or ointments near site for 5 days.    Things to watch for:  You may have a small lump or a bruise.  This is common and will go away.  If bleeding occurs from the site, or a hematoma (lump) begins to increase in size, immediately apply pressure directly over the site and call 911 to return to the hospital.  Report any coolness or numbness in the arm or hand immediately.  Report any excessive pain of the arm or hand immediately.  If mild discomfort occurs at the puncture site you may place an ice pack on the site at 15 minute intervals..   Watch for signs and symptoms of an infection at the arm site (fever, local pain, redness, warmth or discharge from the puncture site), call your physician immediately if any develop.    Other:  No work/school for 72 hours if you received a stent; otherwise you may go back to work the following day (as long as your job does not interfere with the lifting restrictions).      Any Questions please call us at [phone] (between 7am- 5pm, Mon-Fri).  After hours you should contact your physician.          [facility]  Cardiovascular Special Procedures  General Discharge  Instructions    PROCEDURE: Left Heart Catheterization    _X___ You may be drowsy or lightheaded after receiving sedation. DO NOT operate a vehicle (automobile, bicycle, motorcycle, machinery, or power tools), make any important decisions or sign any important/legal documents, or drink alcoholic beverages for the next 24 hours  _X___ We strongly suggest that a responsible adult be with you for the next 24 hours for your protection and safety  __X__ If the intravenous catheter site is painful, apply warm wet compresses on the site until the soreness is relieved and elevate the arm above the heart.  Call your physician if no improvement in 2 to 3 days    DIETARY INSTRUCTIONS:    ____ Drink extra fluids over the next 24 hours (If not contraindicated by illness or by physician order)  ____ Start with clear liquids and progress to normal diet as you feel like eating.  If you experience nausea or repeated episodes of vomiting, which persist beyond 12-24 hours, notify your doctor        _X___ Resume your previous diet      MEDICATION INSTRUCTIONS:    ____ See Medication Reconciliation Sheet      SPECIAL INSTRUCTIONS:  ________________________________________________________________________________________________________________________________________________________________________________________________________________________                                                                                                                                                                                                                                                                                                Please make sure that you follow-up with your doctor’s office for your results.      FOLLOW-UP APPOINTMENT    Follow up with MD as directed.    Belongings returned to patient and/or family: Yes.    The Discharge Instructions have been explained to me.  I understand and can verbalize these instructions.

## 2025-07-14 ENCOUNTER — TELEPHONE (OUTPATIENT)
Dept: CARDIOLOGY CLINIC | Age: 65
End: 2025-07-14

## 2025-07-14 DIAGNOSIS — Q21.12 PFO (PATENT FORAMEN OVALE): Primary | ICD-10-CM

## 2025-07-14 NOTE — TELEPHONE ENCOUNTER
PFO closure scheduled for 7/28 at 1pm   Arrive at 1130    [facility]   [address]  [place], [place]    The morning of your procedure you will park in the hospital parking lot and report directly to the main entrance to check in.     Pre-Procedure Instructions   You will need to fast for at least 8 hours prior to procedure. No caffeine the morning of.   All other medications can be taken in the morning with sips of water.   Do not use any lotions, creams or perfume the morning of procedure.   Pre-procedure lab work will need to be completed 5-7 days prior to the procedure and will need UA completed.   Please have a responsible adult to drive you home after procedure. We advise you have someone to stay with you for 24 hours following procedure for precautionary measures. Depending on procedure you may require an overnight stay.   Cath lab will provide you with all post procedure instructions.     If you have any questions regarding the procedure itself or medications, please call [phone] and ask to speak with a nurse.     Called and spoke to patient and he is agreeable to date and time. Reviewed instructions with patient. Encouraged him to call back with any questions or concerns. He verbalized understanding.

## 2025-07-17 ENCOUNTER — TELEPHONE (OUTPATIENT)
Dept: CARDIOLOGY CLINIC | Age: 65
End: 2025-07-17

## 2025-07-17 NOTE — TELEPHONE ENCOUNTER
Patient is scheduled on 7/28 for PFO with Dr. Mars.  Patient's insurance has denied the request for authorization.      For peer to peer, please call the peer to peer support team to SCHEDULE the peer to peer. This must be done asap to avoid this getting fully denied and needing to have an appeal.      Phone:  [phone]  Service Reference #[id number]    Clinicals Provided at time of submission:    OFFICE VISIT WITH REID  OFFICE VISIT WITH NEUROLOGIST  ECHO  [facility]  MONITOR   MRI BRAIN

## 2025-07-21 NOTE — TELEPHONE ENCOUNTER
Patient returned call and made aware that I would write and appeal letter for the PFO closure and I will call him back once a decision is made. He verbalized understanding.

## 2025-08-14 ENCOUNTER — OFFICE VISIT (OUTPATIENT)
Dept: CARDIOLOGY CLINIC | Age: 65
End: 2025-08-14
Payer: COMMERCIAL

## 2025-08-14 VITALS
SYSTOLIC BLOOD PRESSURE: 126 MMHG | HEIGHT: 74 IN | OXYGEN SATURATION: 96 % | BODY MASS INDEX: 34.97 KG/M2 | WEIGHT: 272.5 LBS | HEART RATE: 70 BPM | DIASTOLIC BLOOD PRESSURE: 78 MMHG

## 2025-08-14 DIAGNOSIS — E66.812 CLASS II OBESITY: ICD-10-CM

## 2025-08-14 DIAGNOSIS — Z86.73 H/O: CVA (CEREBROVASCULAR ACCIDENT): ICD-10-CM

## 2025-08-14 DIAGNOSIS — Q21.12 PFO (PATENT FORAMEN OVALE): Primary | ICD-10-CM

## 2025-08-14 DIAGNOSIS — E78.2 MIXED HYPERLIPIDEMIA: ICD-10-CM

## 2025-08-14 DIAGNOSIS — I63.9 OCCIPITAL STROKE (HCC): ICD-10-CM

## 2025-08-14 DIAGNOSIS — R93.1 ELEVATED CORONARY ARTERY CALCIUM SCORE: ICD-10-CM

## 2025-08-14 DIAGNOSIS — G47.30 SLEEP APNEA, UNSPECIFIED TYPE: ICD-10-CM

## 2025-08-14 PROCEDURE — 1036F TOBACCO NON-USER: CPT | Performed by: INTERNAL MEDICINE

## 2025-08-14 PROCEDURE — G8427 DOCREV CUR MEDS BY ELIG CLIN: HCPCS | Performed by: INTERNAL MEDICINE

## 2025-08-14 PROCEDURE — 3017F COLORECTAL CA SCREEN DOC REV: CPT | Performed by: INTERNAL MEDICINE

## 2025-08-14 PROCEDURE — G8417 CALC BMI ABV UP PARAM F/U: HCPCS | Performed by: INTERNAL MEDICINE

## 2025-08-14 PROCEDURE — G2211 COMPLEX E/M VISIT ADD ON: HCPCS | Performed by: INTERNAL MEDICINE

## 2025-08-14 PROCEDURE — 99214 OFFICE O/P EST MOD 30 MIN: CPT | Performed by: INTERNAL MEDICINE

## (undated) DEVICE — GUIDEWIRE VASC L260CM DIA0.035IN RAD 3MM J TIP L7CM PTFE

## (undated) DEVICE — GLIDESHEATH SLENDER STAINLESS STEEL KIT: Brand: GLIDESHEATH SLENDER

## (undated) DEVICE — CATH LAB PACK: Brand: MEDLINE INDUSTRIES, INC.

## (undated) DEVICE — CATHETER DIAG 5FR L100CM LUMN ID0.047IN JR4 CRV 0 SIDE H

## (undated) DEVICE — PAD, DEFIB, ADULT, RADIOTRANS, PHYSIO: Brand: MEDLINE

## (undated) DEVICE — CATHETER DIAG 5FR L100CM LUMN ID0.047IN JL3.5 CRV 0 SIDE H

## (undated) DEVICE — TR BAND RADIAL ARTERY COMPRESSION DEVICE: Brand: TR BAND